# Patient Record
Sex: FEMALE | Race: WHITE | NOT HISPANIC OR LATINO | Employment: FULL TIME | URBAN - METROPOLITAN AREA
[De-identification: names, ages, dates, MRNs, and addresses within clinical notes are randomized per-mention and may not be internally consistent; named-entity substitution may affect disease eponyms.]

---

## 2021-05-27 ENCOUNTER — OCCUPATIONAL HEALTH (OUTPATIENT)
Dept: URGENT CARE | Facility: CLINIC | Age: 31
End: 2021-05-27

## 2021-05-27 DIAGNOSIS — Z02.83 ENCOUNTER FOR DRUG SCREENING: ICD-10-CM

## 2021-05-27 DIAGNOSIS — Z02.1 PRE-EMPLOYMENT EXAMINATION: Primary | ICD-10-CM

## 2021-05-27 PROCEDURE — 97750 LIFT TEST: ICD-10-PCS | Mod: S$GLB,,, | Performed by: FAMILY MEDICINE

## 2021-05-27 PROCEDURE — 80306 DRUG TEST PRSMV INSTRMNT: CPT | Mod: S$GLB,,, | Performed by: FAMILY MEDICINE

## 2021-05-27 PROCEDURE — 99499 UNLISTED E&M SERVICE: CPT | Mod: S$GLB,,, | Performed by: FAMILY MEDICINE

## 2021-05-27 PROCEDURE — 99499 PHYSICAL, BASIC COMPLEXITY: ICD-10-PCS | Mod: S$GLB,,, | Performed by: FAMILY MEDICINE

## 2021-05-27 PROCEDURE — 97750 PHYSICAL PERFORMANCE TEST: CPT | Mod: S$GLB,,, | Performed by: FAMILY MEDICINE

## 2021-05-27 PROCEDURE — 80306 OOH DOT DRUG SCREEN: ICD-10-PCS | Mod: S$GLB,,, | Performed by: FAMILY MEDICINE

## 2021-07-30 ENCOUNTER — INFUSION (OUTPATIENT)
Dept: INFECTIOUS DISEASES | Facility: HOSPITAL | Age: 31
End: 2021-07-30
Attending: EMERGENCY MEDICINE
Payer: OTHER GOVERNMENT

## 2021-07-30 VITALS
SYSTOLIC BLOOD PRESSURE: 96 MMHG | HEART RATE: 79 BPM | OXYGEN SATURATION: 97 % | TEMPERATURE: 98 F | DIASTOLIC BLOOD PRESSURE: 54 MMHG | RESPIRATION RATE: 18 BRPM

## 2021-07-30 DIAGNOSIS — U07.1 COVID-19: Primary | ICD-10-CM

## 2021-07-30 DIAGNOSIS — U07.1 COVID-19: ICD-10-CM

## 2021-07-30 PROCEDURE — 25000003 PHARM REV CODE 250: Performed by: INTERNAL MEDICINE

## 2021-07-30 PROCEDURE — 63600175 PHARM REV CODE 636 W HCPCS: Performed by: INTERNAL MEDICINE

## 2021-07-30 PROCEDURE — M0243 CASIRIVI AND IMDEVI INFUSION: HCPCS | Performed by: INTERNAL MEDICINE

## 2021-07-30 RX ORDER — EPINEPHRINE 0.3 MG/.3ML
0.3 INJECTION SUBCUTANEOUS
Status: DISCONTINUED | OUTPATIENT
Start: 2021-07-30 | End: 2022-10-26

## 2021-07-30 RX ORDER — ALBUTEROL SULFATE 90 UG/1
2 AEROSOL, METERED RESPIRATORY (INHALATION)
Status: ACTIVE | OUTPATIENT
Start: 2021-07-30

## 2021-07-30 RX ORDER — ONDANSETRON 4 MG/1
4 TABLET, ORALLY DISINTEGRATING ORAL ONCE AS NEEDED
Status: DISCONTINUED | OUTPATIENT
Start: 2021-07-30 | End: 2022-04-25

## 2021-07-30 RX ORDER — ACETAMINOPHEN 325 MG/1
650 TABLET ORAL ONCE AS NEEDED
Status: DISCONTINUED | OUTPATIENT
Start: 2021-07-30 | End: 2022-04-25

## 2021-07-30 RX ORDER — SODIUM CHLORIDE 0.9 % (FLUSH) 0.9 %
10 SYRINGE (ML) INJECTION
Status: DISCONTINUED | OUTPATIENT
Start: 2021-07-30 | End: 2022-04-25

## 2021-07-30 RX ORDER — DIPHENHYDRAMINE HYDROCHLORIDE 50 MG/ML
25 INJECTION INTRAMUSCULAR; INTRAVENOUS ONCE AS NEEDED
Status: DISCONTINUED | OUTPATIENT
Start: 2021-07-30 | End: 2022-04-25

## 2021-07-30 RX ADMIN — CASIRIVIMAB AND IMDEVIMAB 600 MG: 600; 600 INJECTION, SOLUTION, CONCENTRATE INTRAVENOUS at 02:07

## 2021-09-09 ENCOUNTER — OFFICE VISIT (OUTPATIENT)
Dept: URGENT CARE | Facility: CLINIC | Age: 31
End: 2021-09-09
Payer: COMMERCIAL

## 2021-09-09 VITALS
TEMPERATURE: 98 F | SYSTOLIC BLOOD PRESSURE: 116 MMHG | HEART RATE: 73 BPM | HEIGHT: 64 IN | OXYGEN SATURATION: 96 % | WEIGHT: 199 LBS | BODY MASS INDEX: 33.97 KG/M2 | RESPIRATION RATE: 16 BRPM | DIASTOLIC BLOOD PRESSURE: 63 MMHG

## 2021-09-09 DIAGNOSIS — Z02.83 ENCOUNTER FOR DRUG SCREENING: ICD-10-CM

## 2021-09-09 DIAGNOSIS — M54.9 DORSALGIA, UNSPECIFIED: Primary | ICD-10-CM

## 2021-09-09 LAB
CTP QC/QA: YES
POC 10 PANEL DRUG SCREEN: NEGATIVE

## 2021-09-09 PROCEDURE — 96372 THER/PROPH/DIAG INJ SC/IM: CPT | Mod: S$GLB,,, | Performed by: FAMILY MEDICINE

## 2021-09-09 PROCEDURE — 80305 POCT RAPID DRUG SCREEN 10 PANEL: ICD-10-PCS | Mod: S$GLB,,, | Performed by: FAMILY MEDICINE

## 2021-09-09 PROCEDURE — 72100 X-RAY EXAM L-S SPINE 2/3 VWS: CPT | Mod: S$GLB,,, | Performed by: RADIOLOGY

## 2021-09-09 PROCEDURE — 99213 OFFICE O/P EST LOW 20 MIN: CPT | Mod: 25,S$GLB,, | Performed by: FAMILY MEDICINE

## 2021-09-09 PROCEDURE — 96372 PR INJECTION,THERAP/PROPH/DIAG2ST, IM OR SUBCUT: ICD-10-PCS | Mod: S$GLB,,, | Performed by: FAMILY MEDICINE

## 2021-09-09 PROCEDURE — 99213 PR OFFICE/OUTPT VISIT, EST, LEVL III, 20-29 MIN: ICD-10-PCS | Mod: 25,S$GLB,, | Performed by: FAMILY MEDICINE

## 2021-09-09 PROCEDURE — 72100 XR LUMBAR SPINE 2 OR 3 VIEWS: ICD-10-PCS | Mod: S$GLB,,, | Performed by: RADIOLOGY

## 2021-09-09 PROCEDURE — 80305 DRUG TEST PRSMV DIR OPT OBS: CPT | Mod: S$GLB,,, | Performed by: FAMILY MEDICINE

## 2021-09-09 RX ORDER — KETOROLAC TROMETHAMINE 30 MG/ML
30 INJECTION, SOLUTION INTRAMUSCULAR; INTRAVENOUS
Status: COMPLETED | OUTPATIENT
Start: 2021-09-09 | End: 2021-09-09

## 2021-09-09 RX ORDER — METHOCARBAMOL 500 MG/1
500 TABLET, FILM COATED ORAL 2 TIMES DAILY PRN
Qty: 30 TABLET | Refills: 0 | Status: SHIPPED | OUTPATIENT
Start: 2021-09-09 | End: 2021-09-19

## 2021-09-09 RX ORDER — METHYLPREDNISOLONE 4 MG/1
4 TABLET ORAL DAILY
Qty: 1 PACKAGE | Refills: 0 | Status: SHIPPED | OUTPATIENT
Start: 2021-09-09 | End: 2022-04-25

## 2021-09-09 RX ADMIN — KETOROLAC TROMETHAMINE 30 MG: 30 INJECTION, SOLUTION INTRAMUSCULAR; INTRAVENOUS at 11:09

## 2021-09-13 ENCOUNTER — OFFICE VISIT (OUTPATIENT)
Dept: URGENT CARE | Facility: CLINIC | Age: 31
End: 2021-09-13
Payer: COMMERCIAL

## 2021-09-13 VITALS
DIASTOLIC BLOOD PRESSURE: 78 MMHG | RESPIRATION RATE: 16 BRPM | TEMPERATURE: 99 F | HEART RATE: 71 BPM | OXYGEN SATURATION: 100 % | WEIGHT: 199 LBS | BODY MASS INDEX: 33.97 KG/M2 | SYSTOLIC BLOOD PRESSURE: 115 MMHG | HEIGHT: 64 IN

## 2021-09-13 DIAGNOSIS — M54.9 DORSALGIA, UNSPECIFIED: Primary | ICD-10-CM

## 2021-09-13 PROCEDURE — 99214 PR OFFICE/OUTPT VISIT, EST, LEVL IV, 30-39 MIN: ICD-10-PCS | Mod: 25,S$GLB,, | Performed by: FAMILY MEDICINE

## 2021-09-13 PROCEDURE — 99214 OFFICE O/P EST MOD 30 MIN: CPT | Mod: 25,S$GLB,, | Performed by: FAMILY MEDICINE

## 2021-09-13 PROCEDURE — 96372 PR INJECTION,THERAP/PROPH/DIAG2ST, IM OR SUBCUT: ICD-10-PCS | Mod: S$GLB,,, | Performed by: FAMILY MEDICINE

## 2021-09-13 PROCEDURE — 96372 THER/PROPH/DIAG INJ SC/IM: CPT | Mod: S$GLB,,, | Performed by: FAMILY MEDICINE

## 2021-09-13 RX ORDER — NAPROXEN 500 MG/1
500 TABLET ORAL 2 TIMES DAILY WITH MEALS
Qty: 30 TABLET | Refills: 0 | Status: SHIPPED | OUTPATIENT
Start: 2021-09-13 | End: 2022-04-25

## 2021-09-13 RX ORDER — KETOROLAC TROMETHAMINE 30 MG/ML
30 INJECTION, SOLUTION INTRAMUSCULAR; INTRAVENOUS
Status: COMPLETED | OUTPATIENT
Start: 2021-09-13 | End: 2021-09-13

## 2021-09-13 RX ADMIN — KETOROLAC TROMETHAMINE 30 MG: 30 INJECTION, SOLUTION INTRAMUSCULAR; INTRAVENOUS at 11:09

## 2021-09-23 ENCOUNTER — CLINICAL SUPPORT (OUTPATIENT)
Dept: REHABILITATION | Facility: HOSPITAL | Age: 31
End: 2021-09-23
Payer: COMMERCIAL

## 2021-09-23 DIAGNOSIS — M54.50 ACUTE LEFT-SIDED LOW BACK PAIN WITHOUT SCIATICA: ICD-10-CM

## 2021-09-23 PROCEDURE — 97110 THERAPEUTIC EXERCISES: CPT

## 2021-09-23 PROCEDURE — 97014 ELECTRIC STIMULATION THERAPY: CPT

## 2021-09-23 PROCEDURE — 97161 PT EVAL LOW COMPLEX 20 MIN: CPT

## 2021-09-23 PROCEDURE — 97140 MANUAL THERAPY 1/> REGIONS: CPT

## 2021-09-24 ENCOUNTER — CLINICAL SUPPORT (OUTPATIENT)
Dept: REHABILITATION | Facility: HOSPITAL | Age: 31
End: 2021-09-24
Payer: COMMERCIAL

## 2021-09-24 DIAGNOSIS — M54.50 ACUTE LEFT-SIDED LOW BACK PAIN WITHOUT SCIATICA: Primary | ICD-10-CM

## 2021-09-24 PROCEDURE — 97014 ELECTRIC STIMULATION THERAPY: CPT

## 2021-09-24 PROCEDURE — 97140 MANUAL THERAPY 1/> REGIONS: CPT

## 2021-09-24 PROCEDURE — 97110 THERAPEUTIC EXERCISES: CPT

## 2021-09-27 PROBLEM — M54.50 ACUTE LEFT-SIDED LOW BACK PAIN WITHOUT SCIATICA: Status: ACTIVE | Noted: 2021-09-27

## 2021-10-04 ENCOUNTER — OFFICE VISIT (OUTPATIENT)
Dept: URGENT CARE | Facility: CLINIC | Age: 31
End: 2021-10-04
Payer: COMMERCIAL

## 2021-10-04 ENCOUNTER — CLINICAL SUPPORT (OUTPATIENT)
Dept: REHABILITATION | Facility: HOSPITAL | Age: 31
End: 2021-10-04
Payer: COMMERCIAL

## 2021-10-04 VITALS
DIASTOLIC BLOOD PRESSURE: 85 MMHG | SYSTOLIC BLOOD PRESSURE: 110 MMHG | OXYGEN SATURATION: 97 % | HEIGHT: 64 IN | BODY MASS INDEX: 33.97 KG/M2 | HEART RATE: 82 BPM | WEIGHT: 199 LBS | TEMPERATURE: 98 F | RESPIRATION RATE: 16 BRPM

## 2021-10-04 DIAGNOSIS — M54.50 ACUTE LEFT-SIDED LOW BACK PAIN WITHOUT SCIATICA: Primary | ICD-10-CM

## 2021-10-04 DIAGNOSIS — M54.9 DORSALGIA, UNSPECIFIED: Primary | ICD-10-CM

## 2021-10-04 PROCEDURE — 99214 OFFICE O/P EST MOD 30 MIN: CPT | Mod: S$GLB,,, | Performed by: FAMILY MEDICINE

## 2021-10-04 PROCEDURE — 97140 MANUAL THERAPY 1/> REGIONS: CPT

## 2021-10-04 PROCEDURE — 97014 ELECTRIC STIMULATION THERAPY: CPT

## 2021-10-04 PROCEDURE — 99214 PR OFFICE/OUTPT VISIT, EST, LEVL IV, 30-39 MIN: ICD-10-PCS | Mod: S$GLB,,, | Performed by: FAMILY MEDICINE

## 2021-10-04 PROCEDURE — 97110 THERAPEUTIC EXERCISES: CPT

## 2021-10-07 ENCOUNTER — HOSPITAL ENCOUNTER (EMERGENCY)
Facility: HOSPITAL | Age: 31
Discharge: HOME | End: 2021-10-07
Attending: EMERGENCY MEDICINE
Payer: OTHER GOVERNMENT

## 2021-10-07 VITALS
WEIGHT: 198.44 LBS | TEMPERATURE: 99 F | SYSTOLIC BLOOD PRESSURE: 132 MMHG | RESPIRATION RATE: 18 BRPM | HEART RATE: 99 BPM | BODY MASS INDEX: 33.88 KG/M2 | DIASTOLIC BLOOD PRESSURE: 85 MMHG | OXYGEN SATURATION: 98 % | HEIGHT: 64 IN

## 2021-10-07 DIAGNOSIS — J20.9 ACUTE BRONCHITIS, UNSPECIFIED ORGANISM: Primary | ICD-10-CM

## 2021-10-07 DIAGNOSIS — R07.89 ATYPICAL CHEST PAIN: ICD-10-CM

## 2021-10-07 LAB
CTP QC/QA: YES
SARS-COV-2 RDRP RESP QL NAA+PROBE: NEGATIVE

## 2021-10-07 PROCEDURE — U0002 COVID-19 LAB TEST NON-CDC: HCPCS | Performed by: EMERGENCY MEDICINE

## 2021-10-07 PROCEDURE — 93010 ELECTROCARDIOGRAM REPORT: CPT | Mod: ,,, | Performed by: INTERNAL MEDICINE

## 2021-10-07 PROCEDURE — 93005 ELECTROCARDIOGRAM TRACING: CPT

## 2021-10-07 PROCEDURE — 93010 EKG 12-LEAD: ICD-10-PCS | Mod: ,,, | Performed by: INTERNAL MEDICINE

## 2021-10-07 PROCEDURE — 99284 EMERGENCY DEPT VISIT MOD MDM: CPT | Mod: 25

## 2021-10-21 ENCOUNTER — TELEPHONE (OUTPATIENT)
Dept: REHABILITATION | Facility: HOSPITAL | Age: 31
End: 2021-10-21

## 2022-10-26 ENCOUNTER — OFFICE VISIT (OUTPATIENT)
Dept: FAMILY MEDICINE | Facility: CLINIC | Age: 32
End: 2022-10-26
Attending: FAMILY MEDICINE
Payer: COMMERCIAL

## 2022-10-26 VITALS
SYSTOLIC BLOOD PRESSURE: 112 MMHG | WEIGHT: 201.19 LBS | BODY MASS INDEX: 33.52 KG/M2 | HEIGHT: 65 IN | HEART RATE: 84 BPM | OXYGEN SATURATION: 99 % | TEMPERATURE: 97 F | DIASTOLIC BLOOD PRESSURE: 72 MMHG

## 2022-10-26 DIAGNOSIS — K62.89 PROCTALGIA: ICD-10-CM

## 2022-10-26 DIAGNOSIS — K59.00 CONSTIPATION, UNSPECIFIED CONSTIPATION TYPE: Primary | ICD-10-CM

## 2022-10-26 DIAGNOSIS — Z82.49 FAMILY HISTORY OF PREMATURE CAD: ICD-10-CM

## 2022-10-26 DIAGNOSIS — Z72.0 TOBACCO ABUSE: ICD-10-CM

## 2022-10-26 DIAGNOSIS — E66.09 CLASS 1 OBESITY DUE TO EXCESS CALORIES WITHOUT SERIOUS COMORBIDITY WITH BODY MASS INDEX (BMI) OF 33.0 TO 33.9 IN ADULT: ICD-10-CM

## 2022-10-26 PROBLEM — E66.811 CLASS 1 OBESITY DUE TO EXCESS CALORIES WITHOUT SERIOUS COMORBIDITY WITH BODY MASS INDEX (BMI) OF 33.0 TO 33.9 IN ADULT: Status: ACTIVE | Noted: 2022-10-26

## 2022-10-26 PROBLEM — M54.50 ACUTE LEFT-SIDED LOW BACK PAIN WITHOUT SCIATICA: Status: RESOLVED | Noted: 2021-09-27 | Resolved: 2022-10-26

## 2022-10-26 PROCEDURE — 1159F MED LIST DOCD IN RCRD: CPT | Mod: CPTII,S$GLB,, | Performed by: FAMILY MEDICINE

## 2022-10-26 PROCEDURE — 99385 PREV VISIT NEW AGE 18-39: CPT | Mod: S$GLB,,, | Performed by: FAMILY MEDICINE

## 2022-10-26 PROCEDURE — 1160F RVW MEDS BY RX/DR IN RCRD: CPT | Mod: CPTII,S$GLB,, | Performed by: FAMILY MEDICINE

## 2022-10-26 PROCEDURE — 3078F DIAST BP <80 MM HG: CPT | Mod: CPTII,S$GLB,, | Performed by: FAMILY MEDICINE

## 2022-10-26 PROCEDURE — 1159F PR MEDICATION LIST DOCUMENTED IN MEDICAL RECORD: ICD-10-PCS | Mod: CPTII,S$GLB,, | Performed by: FAMILY MEDICINE

## 2022-10-26 PROCEDURE — 99999 PR PBB SHADOW E&M-EST. PATIENT-LVL V: CPT | Mod: PBBFAC,,, | Performed by: FAMILY MEDICINE

## 2022-10-26 PROCEDURE — 3078F PR MOST RECENT DIASTOLIC BLOOD PRESSURE < 80 MM HG: ICD-10-PCS | Mod: CPTII,S$GLB,, | Performed by: FAMILY MEDICINE

## 2022-10-26 PROCEDURE — 3074F PR MOST RECENT SYSTOLIC BLOOD PRESSURE < 130 MM HG: ICD-10-PCS | Mod: CPTII,S$GLB,, | Performed by: FAMILY MEDICINE

## 2022-10-26 PROCEDURE — 99385 PR PREVENTIVE VISIT,NEW,18-39: ICD-10-PCS | Mod: S$GLB,,, | Performed by: FAMILY MEDICINE

## 2022-10-26 PROCEDURE — 99999 PR PBB SHADOW E&M-EST. PATIENT-LVL V: ICD-10-PCS | Mod: PBBFAC,,, | Performed by: FAMILY MEDICINE

## 2022-10-26 PROCEDURE — 3074F SYST BP LT 130 MM HG: CPT | Mod: CPTII,S$GLB,, | Performed by: FAMILY MEDICINE

## 2022-10-26 PROCEDURE — 1160F PR REVIEW ALL MEDS BY PRESCRIBER/CLIN PHARMACIST DOCUMENTED: ICD-10-PCS | Mod: CPTII,S$GLB,, | Performed by: FAMILY MEDICINE

## 2022-10-26 NOTE — PROGRESS NOTES
Subjective:       Patient ID: Rebecca Shetty is a 32 y.o. female.    Chief Complaint: Establish Care    32 y old female with tobacco abuse and family hx of premature CAD  here for a wellness exam . She had BRCAA test and she was told she had a 90% chance of developing colon ca . She has not had  colo . BM alternate from severe constipation to diarrhea. Also gets sudden proctalgia and has felt bulge in anus.  Will like to defer vaccinations at this time. Contemplating smoking cessation . She sees Dr. Khan for Well woman exam . No depression , anxious at times . Past due for opth and dental exam . Plays soccer with her kids. No dietary restrictions      Review of Systems   Constitutional: Negative.    HENT: Negative.     Eyes: Negative.    Respiratory: Negative.     Cardiovascular: Negative.    Gastrointestinal:  Positive for constipation, diarrhea and rectal pain.   Genitourinary: Negative.    Musculoskeletal: Negative.    Skin: Negative.    Hematological: Negative.      Objective:      Physical Exam  Constitutional:       General: She is not in acute distress.     Appearance: She is well-developed. She is not diaphoretic.   HENT:      Head: Normocephalic and atraumatic.      Right Ear: External ear normal.      Left Ear: External ear normal.      Mouth/Throat:      Pharynx: No oropharyngeal exudate.   Eyes:      General: No scleral icterus.        Right eye: No discharge.         Left eye: No discharge.      Conjunctiva/sclera: Conjunctivae normal.      Pupils: Pupils are equal, round, and reactive to light.   Neck:      Thyroid: No thyromegaly.      Vascular: No JVD.      Trachea: No tracheal deviation.   Cardiovascular:      Rate and Rhythm: Normal rate and regular rhythm.      Heart sounds: Normal heart sounds. No murmur heard.    No friction rub. No gallop.   Pulmonary:      Effort: Pulmonary effort is normal. No respiratory distress.      Breath sounds: Normal breath sounds. No stridor. No wheezing  or rales.   Chest:      Chest wall: No tenderness.   Abdominal:      General: Bowel sounds are normal. There is no distension.      Palpations: Abdomen is soft.      Tenderness: There is no abdominal tenderness. There is no guarding or rebound.   Musculoskeletal:         General: Normal range of motion.      Cervical back: Normal range of motion and neck supple.   Lymphadenopathy:      Cervical: No cervical adenopathy.   Skin:     General: Skin is warm and dry.   Neurological:      Mental Status: She is alert and oriented to person, place, and time.   Psychiatric:         Behavior: Behavior normal.         Thought Content: Thought content normal.         Judgment: Judgment normal.       Assessment:           Rebecca was seen today for establish care.    Diagnoses and all orders for this visit:    Constipation, unspecified constipation type  -     Ambulatory referral/consult to Endo Procedure ; Future  -     TSH; Future  -     Ambulatory referral/consult to Colorectal Surgery; Future    Tobacco abuse  -     CBC Auto Differential; Future  -     Comprehensive Metabolic Panel; Future  -     Hemoglobin A1C; Future  -     Lipid Panel; Future    Proctalgia  -     Ambulatory referral/consult to Colorectal Surgery; Future    Family history of premature CAD     Plan:     Rebecca was seen today for establish care.    Diagnoses and all orders for this visit:    Tobacco abuse     CRS, colo   Work on smoking cessation   Statin ?   Discussed and recommended healthy eating habits and lifestyle changes including daily exercise of 30 mins, low salt diet, low fat diet and weight loss/maintenance to a normal body mass index 25 or below. I recommend routine use of seatbelts. Discouraged illicit drug use including tobacco use. I recommend no alcohol use but if you decide to drink, limit to 1 drink per day for females and 2 drinks per day for males. Keep up with your yearly physical visit with age appropriate screenings,  vaccinations, and labs.

## 2022-10-27 ENCOUNTER — LAB VISIT (OUTPATIENT)
Dept: LAB | Facility: HOSPITAL | Age: 32
End: 2022-10-27
Attending: FAMILY MEDICINE
Payer: COMMERCIAL

## 2022-10-27 DIAGNOSIS — Z72.0 TOBACCO ABUSE: ICD-10-CM

## 2022-10-27 DIAGNOSIS — K59.00 CONSTIPATION, UNSPECIFIED CONSTIPATION TYPE: ICD-10-CM

## 2022-10-27 LAB
ALBUMIN SERPL BCP-MCNC: 4.2 G/DL (ref 3.5–5.2)
ALP SERPL-CCNC: 77 U/L (ref 55–135)
ALT SERPL W/O P-5'-P-CCNC: 16 U/L (ref 10–44)
ANION GAP SERPL CALC-SCNC: 9 MMOL/L (ref 8–16)
AST SERPL-CCNC: 16 U/L (ref 10–40)
BASOPHILS # BLD AUTO: 0.06 K/UL (ref 0–0.2)
BASOPHILS NFR BLD: 0.7 % (ref 0–1.9)
BILIRUB SERPL-MCNC: 0.6 MG/DL (ref 0.1–1)
BUN SERPL-MCNC: 10 MG/DL (ref 6–20)
CALCIUM SERPL-MCNC: 9.4 MG/DL (ref 8.7–10.5)
CHLORIDE SERPL-SCNC: 105 MMOL/L (ref 95–110)
CHOLEST SERPL-MCNC: 208 MG/DL (ref 120–199)
CHOLEST/HDLC SERPL: 4.2 {RATIO} (ref 2–5)
CO2 SERPL-SCNC: 22 MMOL/L (ref 23–29)
CREAT SERPL-MCNC: 0.7 MG/DL (ref 0.5–1.4)
DIFFERENTIAL METHOD: NORMAL
EOSINOPHIL # BLD AUTO: 0.4 K/UL (ref 0–0.5)
EOSINOPHIL NFR BLD: 4.6 % (ref 0–8)
ERYTHROCYTE [DISTWIDTH] IN BLOOD BY AUTOMATED COUNT: 12 % (ref 11.5–14.5)
EST. GFR  (NO RACE VARIABLE): >60 ML/MIN/1.73 M^2
ESTIMATED AVG GLUCOSE: 100 MG/DL (ref 68–131)
GLUCOSE SERPL-MCNC: 88 MG/DL (ref 70–110)
HBA1C MFR BLD: 5.1 % (ref 4–5.6)
HCT VFR BLD AUTO: 45.8 % (ref 37–48.5)
HDLC SERPL-MCNC: 49 MG/DL (ref 40–75)
HDLC SERPL: 23.6 % (ref 20–50)
HGB BLD-MCNC: 15.4 G/DL (ref 12–16)
IMM GRANULOCYTES # BLD AUTO: 0.03 K/UL (ref 0–0.04)
IMM GRANULOCYTES NFR BLD AUTO: 0.4 % (ref 0–0.5)
LDLC SERPL CALC-MCNC: 144.4 MG/DL (ref 63–159)
LYMPHOCYTES # BLD AUTO: 1.9 K/UL (ref 1–4.8)
LYMPHOCYTES NFR BLD: 21.8 % (ref 18–48)
MCH RBC QN AUTO: 30.4 PG (ref 27–31)
MCHC RBC AUTO-ENTMCNC: 33.6 G/DL (ref 32–36)
MCV RBC AUTO: 91 FL (ref 82–98)
MONOCYTES # BLD AUTO: 0.5 K/UL (ref 0.3–1)
MONOCYTES NFR BLD: 5.3 % (ref 4–15)
NEUTROPHILS # BLD AUTO: 5.7 K/UL (ref 1.8–7.7)
NEUTROPHILS NFR BLD: 67.2 % (ref 38–73)
NONHDLC SERPL-MCNC: 159 MG/DL
NRBC BLD-RTO: 0 /100 WBC
PLATELET # BLD AUTO: 336 K/UL (ref 150–450)
PMV BLD AUTO: 10.7 FL (ref 9.2–12.9)
POTASSIUM SERPL-SCNC: 3.9 MMOL/L (ref 3.5–5.1)
PROT SERPL-MCNC: 7.2 G/DL (ref 6–8.4)
RBC # BLD AUTO: 5.06 M/UL (ref 4–5.4)
SODIUM SERPL-SCNC: 136 MMOL/L (ref 136–145)
TRIGL SERPL-MCNC: 73 MG/DL (ref 30–150)
TSH SERPL DL<=0.005 MIU/L-ACNC: 1.26 UIU/ML (ref 0.4–4)
WBC # BLD AUTO: 8.5 K/UL (ref 3.9–12.7)

## 2022-10-27 PROCEDURE — 84443 ASSAY THYROID STIM HORMONE: CPT | Performed by: FAMILY MEDICINE

## 2022-10-27 PROCEDURE — 80061 LIPID PANEL: CPT | Performed by: FAMILY MEDICINE

## 2022-10-27 PROCEDURE — 36415 COLL VENOUS BLD VENIPUNCTURE: CPT | Mod: PO | Performed by: FAMILY MEDICINE

## 2022-10-27 PROCEDURE — 83036 HEMOGLOBIN GLYCOSYLATED A1C: CPT | Performed by: FAMILY MEDICINE

## 2022-10-27 PROCEDURE — 85025 COMPLETE CBC W/AUTO DIFF WBC: CPT | Performed by: FAMILY MEDICINE

## 2022-10-27 PROCEDURE — 80053 COMPREHEN METABOLIC PANEL: CPT | Performed by: FAMILY MEDICINE

## 2022-10-28 ENCOUNTER — HOSPITAL ENCOUNTER (OUTPATIENT)
Dept: PREADMISSION TESTING | Facility: HOSPITAL | Age: 32
Discharge: HOME | End: 2022-10-28
Payer: COMMERCIAL

## 2022-10-28 DIAGNOSIS — K59.00 CONSTIPATION, UNSPECIFIED CONSTIPATION TYPE: Primary | ICD-10-CM

## 2022-10-28 RX ORDER — POLYETHYLENE GLYCOL 3350, SODIUM SULFATE ANHYDROUS, SODIUM BICARBONATE, SODIUM CHLORIDE, POTASSIUM CHLORIDE 236; 22.74; 6.74; 5.86; 2.97 G/4L; G/4L; G/4L; G/4L; G/4L
4 POWDER, FOR SOLUTION ORAL ONCE
Qty: 4000 ML | Refills: 0 | Status: SHIPPED | OUTPATIENT
Start: 2022-10-28 | End: 2022-10-28

## 2022-11-10 ENCOUNTER — PATIENT MESSAGE (OUTPATIENT)
Dept: HEPATOLOGY | Facility: CLINIC | Age: 32
End: 2022-11-10
Payer: COMMERCIAL

## 2022-11-18 ENCOUNTER — PATIENT MESSAGE (OUTPATIENT)
Dept: GASTROENTEROLOGY | Facility: CLINIC | Age: 32
End: 2022-11-18
Payer: COMMERCIAL

## 2022-11-21 ENCOUNTER — TELEPHONE (OUTPATIENT)
Dept: FAMILY MEDICINE | Facility: CLINIC | Age: 32
End: 2022-11-21
Payer: COMMERCIAL

## 2022-11-21 DIAGNOSIS — Z12.11 COLON CANCER SCREENING: Primary | ICD-10-CM

## 2022-11-21 NOTE — TELEPHONE ENCOUNTER
----- Message from Joya Sherwood LPN sent at 11/21/2022  1:45 PM CST -----  Please adjust code for endo procedure on 11/30.     ----- Message -----  From: Sadie Cummings  Sent: 11/21/2022   1:44 PM CST  To: Monique Murray Staff    Please call pt @ [phone] regarding procedure romina 11/30, pt states states the price is 1.200, but doctor can code it as preventive care and pt will not have to pay copay will not be so cruz

## 2022-11-30 ENCOUNTER — HOSPITAL ENCOUNTER (OUTPATIENT)
Facility: HOSPITAL | Age: 32
Discharge: HOME | End: 2022-11-30
Attending: COLON & RECTAL SURGERY | Admitting: COLON & RECTAL SURGERY
Payer: COMMERCIAL

## 2022-11-30 ENCOUNTER — ANESTHESIA (OUTPATIENT)
Dept: ENDOSCOPY | Facility: HOSPITAL | Age: 32
End: 2022-11-30
Payer: COMMERCIAL

## 2022-11-30 ENCOUNTER — ANESTHESIA EVENT (OUTPATIENT)
Dept: ENDOSCOPY | Facility: HOSPITAL | Age: 32
End: 2022-11-30
Payer: COMMERCIAL

## 2022-11-30 ENCOUNTER — PATIENT MESSAGE (OUTPATIENT)
Dept: GASTROENTEROLOGY | Facility: CLINIC | Age: 32
End: 2022-11-30
Payer: COMMERCIAL

## 2022-11-30 DIAGNOSIS — Z12.11 SCREENING FOR COLON CANCER: ICD-10-CM

## 2022-11-30 LAB
B-HCG UR QL: NEGATIVE
CTP QC/QA: YES

## 2022-11-30 PROCEDURE — 37000009 HC ANESTHESIA EA ADD 15 MINS: Performed by: COLON & RECTAL SURGERY

## 2022-11-30 PROCEDURE — 81025 URINE PREGNANCY TEST: CPT | Performed by: COLON & RECTAL SURGERY

## 2022-11-30 PROCEDURE — 37000008 HC ANESTHESIA 1ST 15 MINUTES: Performed by: COLON & RECTAL SURGERY

## 2022-11-30 PROCEDURE — G0121 COLON CA SCRN NOT HI RSK IND: ICD-10-PCS | Mod: ,,, | Performed by: COLON & RECTAL SURGERY

## 2022-11-30 PROCEDURE — G0121 COLON CA SCRN NOT HI RSK IND: HCPCS | Performed by: COLON & RECTAL SURGERY

## 2022-11-30 PROCEDURE — 63600175 PHARM REV CODE 636 W HCPCS: Performed by: COLON & RECTAL SURGERY

## 2022-11-30 PROCEDURE — 25000003 PHARM REV CODE 250: Performed by: NURSE ANESTHETIST, CERTIFIED REGISTERED

## 2022-11-30 PROCEDURE — 63600175 PHARM REV CODE 636 W HCPCS: Performed by: NURSE ANESTHETIST, CERTIFIED REGISTERED

## 2022-11-30 PROCEDURE — G0121 COLON CA SCRN NOT HI RSK IND: HCPCS | Mod: ,,, | Performed by: COLON & RECTAL SURGERY

## 2022-11-30 RX ORDER — PROPOFOL 10 MG/ML
VIAL (ML) INTRAVENOUS
Status: DISCONTINUED | OUTPATIENT
Start: 2022-11-30 | End: 2022-11-30

## 2022-11-30 RX ORDER — SODIUM CHLORIDE, SODIUM LACTATE, POTASSIUM CHLORIDE, CALCIUM CHLORIDE 600; 310; 30; 20 MG/100ML; MG/100ML; MG/100ML; MG/100ML
INJECTION, SOLUTION INTRAVENOUS CONTINUOUS
Status: DISCONTINUED | OUTPATIENT
Start: 2022-11-30 | End: 2022-11-30 | Stop reason: HOSPADM

## 2022-11-30 RX ORDER — LIDOCAINE HYDROCHLORIDE 10 MG/ML
INJECTION, SOLUTION EPIDURAL; INFILTRATION; INTRACAUDAL; PERINEURAL
Status: DISCONTINUED | OUTPATIENT
Start: 2022-11-30 | End: 2022-11-30

## 2022-11-30 RX ADMIN — LIDOCAINE HYDROCHLORIDE 30 MG: 10 INJECTION, SOLUTION EPIDURAL; INFILTRATION; INTRACAUDAL; PERINEURAL at 11:11

## 2022-11-30 RX ADMIN — PROPOFOL 30 MG: 10 INJECTION, EMULSION INTRAVENOUS at 11:11

## 2022-11-30 RX ADMIN — PROPOFOL 100 MG: 10 INJECTION, EMULSION INTRAVENOUS at 11:11

## 2022-11-30 RX ADMIN — SODIUM CHLORIDE, SODIUM LACTATE, POTASSIUM CHLORIDE, AND CALCIUM CHLORIDE: 600; 310; 30; 20 INJECTION, SOLUTION INTRAVENOUS at 11:11

## 2022-11-30 NOTE — TRANSFER OF CARE
"Anesthesia Transfer of Care Note    Patient: Rebecca Shetty    Procedure(s) Performed: Procedure(s) (LRB):  COLONOSCOPY (N/A)    Patient location: GI    Anesthesia Type: MAC    Transport from OR: Transported from OR on room air with adequate spontaneous ventilation    Post pain: adequate analgesia    Post assessment: no apparent anesthetic complications    Post vital signs: stable    Level of consciousness: awake    Nausea/Vomiting: no nausea/vomiting    Complications: none    Transfer of care protocol was followed      Last vitals:   Visit Vitals  /72 (BP Location: Left arm, Patient Position: Lying)   Pulse 75   Temp 37.1 °C (98.8 °F) (Temporal)   Resp 16   Ht 5' 5" (1.651 m)   Wt 88.5 kg (195 lb)   SpO2 98%   Breastfeeding No   BMI 32.45 kg/m²     "

## 2022-11-30 NOTE — PLAN OF CARE
Dr. Subramanian  bedside reviewing results with pt and family. Back to baseline. No pain, no n/v. No bleeding. VSS. Discharge folder received. Pt discharged.

## 2022-11-30 NOTE — PROVATION PATIENT INSTRUCTIONS
Discharge Summary/Instructions after an Endoscopic Procedure  Patient Name: Rebecca Shetty  Patient MRN: [id number]  Patient YOB: 1990  Wednesday, November 30, 2022 Dannie Subramanian MD  Dear patient,  As a result of recent federal legislation (The Federal Cures Act), you may   receive lab or pathology results from your procedure in your [facility]   account before your physician is able to contact you. Your physician or   their representative will relay the results to you with their   recommendations at their soonest availability.  Thank you,  RESTRICTIONS:  During your procedure today, you received medications for sedation.  These   medications may affect your judgment, balance and coordination.  Therefore,   for 24 hours, you have the following restrictions:   - DO NOT drive a car, operate machinery, make legal/financial decisions,   sign important papers or drink alcohol.    ACTIVITY:  Today: no heavy lifting, straining or running due to procedural   sedation/anesthesia.  The following day: return to full activity including work.  DIET:  Eat and drink normally unless instructed otherwise.     TREATMENT FOR COMMON SIDE EFFECTS:  - Mild abdominal pain, nausea, belching, bloating or excessive gas:  rest,   eat lightly and use a heating pad.  - Sore Throat: treat with throat lozenges and/or gargle with warm salt   water.  - Because air was used during the procedure, expelling large amounts of air   from your rectum or belching is normal.  - If a bowel prep was taken, you may not have a bowel movement for 1-3 days.    This is normal.  SYMPTOMS TO WATCH FOR AND REPORT TO YOUR PHYSICIAN:  1. Abdominal pain or bloating, other than gas cramps.  2. Chest pain.  3. Back pain.  4. Signs of infection such as: chills or fever occurring within 24 hours   after the procedure.  5. Rectal bleeding, which would show as bright red, maroon, or black stools.   (A tablespoon of blood from the rectum is not serious, especially  if   hemorrhoids are present.)  6. Vomiting.  7. Weakness or dizziness.  GO DIRECTLY TO THE NEAREST EMERGENCY ROOM IF YOU HAVE ANY OF THE FOLLOWING:      Difficulty breathing              Chills and/or fever over 101 F   Persistent vomiting and/or vomiting blood   Severe abdominal pain   Severe chest pain   Black, tarry stools   Bleeding- more than one tablespoon   Any other symptom or condition that you feel may need urgent attention  Your doctor recommends these additional instructions:  If any biopsies were taken, your doctors clinic will contact you in 1 to 2   weeks with any results.  - Discharge patient to home.   - Resume previous diet.   - Continue present medications.   - Repeat colonoscopy in 5 years for screening purposes.   - Return to primary care physician PRN.  For questions, problems or results please call your physician Dannie Subramanian MD at Work:  [phone]  If you have any questions about the above instructions, call the GI   department at [phone] or call the endoscopy unit at [phone]   from 7am until 3 pm.  [facility], EMERGENCY ROOM PHONE NUMBER:   [phone]  IF A COMPLICATION OR EMERGENCY SITUATION ARISES AND YOU ARE UNABLE TO REACH   YOUR PHYSICIAN - GO DIRECTLY TO THE EMERGENCY ROOM.  I have read or have had read to me these discharge instructions for my   procedure and have received a written copy.  I understand these   instructions and will follow-up with my physician if I have any questions.     __________________________________       _____________________________________  Nurse Signature                                          Patient/Designated   Responsible Party Signature  MD Dannie Cross MD  11/30/2022 11:27:20 AM  This report has been verified and signed electronically.  Dear patient,  As a result of recent federal legislation (The Federal Cures Act), you may   receive lab or pathology results from your  procedure in your [facility]   account before your physician is able to contact you. Your physician or   their representative will relay the results to you with their   recommendations at their soonest availability.  Thank you,  PROVATION

## 2022-11-30 NOTE — H&P
"[facility] ([facility])  Colon and Rectal Surgery  History & Physical    Patient Name: Rebecca Shetty  MRN: [id number]  Admission Date: 11/30/2022  Attending Physician: Dannie Subramanian MD  Primary Care Provider: Terri Heaton MD    Patient information was obtained from patient and medical records.    Subjective:     Chief Complaint/Reason for Admission: Here for Colonoscopy    History of Present Illness:  Patient is a 32 y.o. female presents for colonoscopy. Never had colonoscopy. No hematochezia, melena or change in bowel habits. No personal or fam hx of CRC, polyps or IBD. Does report previous BRCA testing and being told she has a high chance of CRC in future (no records available currently).     No current facility-administered medications on file prior to encounter.     No current outpatient medications on file prior to encounter.       Review of patient's allergies indicates:   Allergen Reactions    Ceclor [cefaclor] Other (See Comments)     "Really out of it"    Ciprofloxacin Other (See Comments)     Pt reports that it makes her feel disconnected       History reviewed. No pertinent past medical history.  Past Surgical History:   Procedure Laterality Date    INTRAUTERINE DEVICE INSERTION  05/11/2022    Kyleena    TONSILLECTOMY      TYMPANOSTOMY TUBE PLACEMENT       Family History       Problem Relation (Age of Onset)    Cancer Mother, Maternal Grandfather, Paternal Grandfather    Diabetes Father    Heart disease Father (57)    Hyperlipidemia Father    Hypertension Father    Seizures Father          Tobacco Use    Smoking status: Every Day     Packs/day: 1.00     Types: Cigarettes    Smokeless tobacco: Never   Substance and Sexual Activity    Alcohol use: Yes    Drug use: No    Sexual activity: Not on file     Review of Systems   Constitutional:  Negative for activity change, appetite change, chills, fatigue, fever and unexpected weight change.   HENT:  Negative for congestion, ear pain, " sore throat and trouble swallowing.    Eyes:  Negative for pain, redness and itching.   Respiratory:  Negative for cough, shortness of breath and wheezing.    Cardiovascular:  Negative for chest pain, palpitations and leg swelling.   Gastrointestinal:  Negative for abdominal distention, abdominal pain, anal bleeding, blood in stool, constipation, diarrhea, nausea, rectal pain and vomiting.   Endocrine: Negative for cold intolerance, heat intolerance and polyuria.   Genitourinary:  Negative for dysuria, flank pain, frequency and hematuria.   Musculoskeletal:  Negative for gait problem, joint swelling and neck pain.   Skin:  Negative for color change, rash and wound.   Allergic/Immunologic: Negative for environmental allergies and immunocompromised state.   Neurological:  Negative for dizziness, speech difficulty, weakness and numbness.   Psychiatric/Behavioral:  Negative for agitation, confusion and hallucinations.    Objective:     Vital Signs (Most Recent):  Temp: 98.8 °F (37.1 °C) (11/30/22 1041)  Pulse: 75 (11/30/22 1041)  Resp: 16 (11/30/22 1041)  BP: 107/72 (11/30/22 1041)  SpO2: 98 % (11/30/22 1041) Vital Signs (24h Range):  Temp:  [98.8 °F (37.1 °C)] 98.8 °F (37.1 °C)  Pulse:  [75] 75  Resp:  [16] 16  SpO2:  [98 %] 98 %  BP: (107)/(72) 107/72     Weight: 88.5 kg (195 lb)  Body mass index is 32.45 kg/m².    Physical Exam  Constitutional:       Appearance: She is well-developed.   HENT:      Head: Normocephalic and atraumatic.   Eyes:      Conjunctiva/sclera: Conjunctivae normal.   Neck:      Thyroid: No thyromegaly.   Cardiovascular:      Rate and Rhythm: Normal rate and regular rhythm.   Pulmonary:      Effort: Pulmonary effort is normal. No respiratory distress.   Abdominal:      General: There is no distension.      Palpations: Abdomen is soft. There is no mass.      Tenderness: There is no abdominal tenderness.   Musculoskeletal:         General: No tenderness. Normal range of motion.      Cervical back:  Normal range of motion.   Skin:     General: Skin is warm and dry.      Capillary Refill: Capillary refill takes less than 2 seconds.      Findings: No rash.   Neurological:      Mental Status: She is alert and oriented to person, place, and time.         Assessment/Plan:     Patient is a 32 y.o. female who presents for colonoscopy     - Ok to proceed to endoscopy suite for colonoscopy  - Consent obtained. All risks, benefits and alternatives fully explained to patient, including but not limited to bleeding, infection, perforation, and missed polyps. All questions appropriately answered to patient's satisfaction. Consent signed and placed on chart.    There are no hospital problems to display for this patient.    VTE Risk Mitigation (From admission, onward)      None            Dannie Subramanian MD  Colon and Rectal Surgery  [facility] ([facility])

## 2022-11-30 NOTE — ANESTHESIA PREPROCEDURE EVALUATION
11/30/2022  Rebecca Shetty is a 32 y.o., female.      Pre-op Assessment    I have reviewed the Patient Summary Reports.     I have reviewed the Nursing Notes. I have reviewed the NPO Status.   I have reviewed the Medications.     Review of Systems  Anesthesia Hx:  Denies Family Hx of Anesthesia complications.   Denies Personal Hx of Anesthesia complications.   Social:  Smoker    Cardiovascular:  Cardiovascular Normal     Pulmonary:  Pulmonary Normal    Neurological:  Neurology Normal    Endocrine:  Obesity / BMI > 30      Physical Exam  General: Oriented, Alert, Cooperative and Well nourished    Airway:  Mallampati: II   Mouth Opening: Normal  TM Distance: Normal  Tongue: Normal    Dental:  Intact        Anesthesia Plan  Type of Anesthesia, risks & benefits discussed:    Anesthesia Type: MAC  Intra-op Monitoring Plan: Standard ASA Monitors  Post Op Pain Control Plan: IV/PO Opioids PRN  Induction:  IV  Informed Consent: Informed consent signed with the Patient and all parties understand the risks and agree with anesthesia plan.  All questions answered.   ASA Score: 2  Day of Surgery Review of History & Physical: H&P Update referred to the surgeon/provider.I have interviewed and examined the patient. I have reviewed the patient's H&P dated:     Ready For Surgery From Anesthesia Perspective.     .

## 2022-11-30 NOTE — ANESTHESIA POSTPROCEDURE EVALUATION
Anesthesia Post Evaluation    Patient: Rebecca Shetty    Procedure(s) Performed: Procedure(s) (LRB):  COLONOSCOPY (N/A)    Final Anesthesia Type: MAC      Patient location during evaluation: GI PACU  Patient participation: Yes- Able to Participate  Level of consciousness: awake and alert  Post-procedure vital signs: reviewed and stable  Pain management: adequate  Airway patency: patent    PONV status at discharge: No PONV  Anesthetic complications: no      Cardiovascular status: blood pressure returned to baseline and hemodynamically stable  Respiratory status: unassisted, spontaneous ventilation and room air  Hydration status: euvolemic  Follow-up not needed.          Vitals Value Taken Time   /68 11/30/22 1147   Temp 36.7 °C (98.1 °F) 11/30/22 1147   Pulse 72 11/30/22 1147   Resp 16 11/30/22 1147   SpO2 100 % 11/30/22 1147         Event Time   Out of Recovery 11:50:32         Pain/Mohamud Score: Mohamud Score: 10 (11/30/2022 11:42 AM)

## 2022-12-01 VITALS
HEIGHT: 65 IN | HEART RATE: 72 BPM | BODY MASS INDEX: 32.49 KG/M2 | DIASTOLIC BLOOD PRESSURE: 68 MMHG | SYSTOLIC BLOOD PRESSURE: 106 MMHG | WEIGHT: 195 LBS | RESPIRATION RATE: 16 BRPM | TEMPERATURE: 98 F | OXYGEN SATURATION: 100 %

## 2023-02-22 ENCOUNTER — DOCUMENTATION ONLY (OUTPATIENT)
Dept: GASTROENTEROLOGY | Facility: CLINIC | Age: 33
End: 2023-02-22

## 2023-02-22 ENCOUNTER — OFFICE VISIT (OUTPATIENT)
Dept: GASTROENTEROLOGY | Facility: CLINIC | Age: 33
End: 2023-02-22
Attending: FAMILY MEDICINE
Payer: COMMERCIAL

## 2023-02-22 ENCOUNTER — PATIENT MESSAGE (OUTPATIENT)
Dept: GASTROENTEROLOGY | Facility: CLINIC | Age: 33
End: 2023-02-22

## 2023-02-22 VITALS
DIASTOLIC BLOOD PRESSURE: 78 MMHG | HEIGHT: 65 IN | BODY MASS INDEX: 34.54 KG/M2 | SYSTOLIC BLOOD PRESSURE: 104 MMHG | OXYGEN SATURATION: 99 % | HEART RATE: 80 BPM | WEIGHT: 207.31 LBS

## 2023-02-22 DIAGNOSIS — K62.89 PROCTALGIA: ICD-10-CM

## 2023-02-22 DIAGNOSIS — R11.0 NAUSEA: ICD-10-CM

## 2023-02-22 DIAGNOSIS — K21.9 GASTROESOPHAGEAL REFLUX DISEASE, UNSPECIFIED WHETHER ESOPHAGITIS PRESENT: Primary | ICD-10-CM

## 2023-02-22 DIAGNOSIS — K59.00 CONSTIPATION, UNSPECIFIED CONSTIPATION TYPE: ICD-10-CM

## 2023-02-22 PROCEDURE — 99203 PR OFFICE/OUTPT VISIT, NEW, LEVL III, 30-44 MIN: ICD-10-PCS | Mod: S$GLB,,, | Performed by: PHYSICIAN ASSISTANT

## 2023-02-22 PROCEDURE — 3008F BODY MASS INDEX DOCD: CPT | Mod: CPTII,S$GLB,, | Performed by: PHYSICIAN ASSISTANT

## 2023-02-22 PROCEDURE — 1159F PR MEDICATION LIST DOCUMENTED IN MEDICAL RECORD: ICD-10-PCS | Mod: CPTII,S$GLB,, | Performed by: PHYSICIAN ASSISTANT

## 2023-02-22 PROCEDURE — 3078F DIAST BP <80 MM HG: CPT | Mod: CPTII,S$GLB,, | Performed by: PHYSICIAN ASSISTANT

## 2023-02-22 PROCEDURE — 3078F PR MOST RECENT DIASTOLIC BLOOD PRESSURE < 80 MM HG: ICD-10-PCS | Mod: CPTII,S$GLB,, | Performed by: PHYSICIAN ASSISTANT

## 2023-02-22 PROCEDURE — 3074F SYST BP LT 130 MM HG: CPT | Mod: CPTII,S$GLB,, | Performed by: PHYSICIAN ASSISTANT

## 2023-02-22 PROCEDURE — 99203 OFFICE O/P NEW LOW 30 MIN: CPT | Mod: S$GLB,,, | Performed by: PHYSICIAN ASSISTANT

## 2023-02-22 PROCEDURE — 3008F PR BODY MASS INDEX (BMI) DOCUMENTED: ICD-10-PCS | Mod: CPTII,S$GLB,, | Performed by: PHYSICIAN ASSISTANT

## 2023-02-22 PROCEDURE — 99999 PR PBB SHADOW E&M-EST. PATIENT-LVL V: CPT | Mod: PBBFAC,,, | Performed by: PHYSICIAN ASSISTANT

## 2023-02-22 PROCEDURE — 3074F PR MOST RECENT SYSTOLIC BLOOD PRESSURE < 130 MM HG: ICD-10-PCS | Mod: CPTII,S$GLB,, | Performed by: PHYSICIAN ASSISTANT

## 2023-02-22 PROCEDURE — 1159F MED LIST DOCD IN RCRD: CPT | Mod: CPTII,S$GLB,, | Performed by: PHYSICIAN ASSISTANT

## 2023-02-22 PROCEDURE — 99999 PR PBB SHADOW E&M-EST. PATIENT-LVL V: ICD-10-PCS | Mod: PBBFAC,,, | Performed by: PHYSICIAN ASSISTANT

## 2023-02-22 RX ORDER — PANTOPRAZOLE SODIUM 40 MG/1
40 TABLET, DELAYED RELEASE ORAL DAILY
Qty: 30 TABLET | Refills: 11 | Status: SHIPPED | OUTPATIENT
Start: 2023-02-22 | End: 2024-03-04 | Stop reason: SDUPTHER

## 2023-02-22 NOTE — PROGRESS NOTES
Clinic Consult:  [facility] Consultation Note    Reason for Consult:  The primary encounter diagnosis was Gastroesophageal reflux disease, unspecified whether esophagitis present. Diagnoses of Constipation, unspecified constipation type, Proctalgia, and Nausea were also pertinent to this visit.    PCP: Terri Heaton   [address] [facility] / [facility] [id number]    CC: Constipation      HPI:  This is a 32 y.o. female here for evaluation of the above.   Alternates constipation and diarrhea for years. Tends more toward constipation. Tried OTC medication. Laxatives but has to take multiple in order for them to work. Constipation for a week or 2 then follow with diarrhea. Urgent. No blood. Just had a colonoscopy a couple months ago with colorectal.  Nausea and abdominal soreness often. Deep. Last BM was yesterday but did not empty well. No vomiting.   Was told she needed an upper scope every year, but has not had one in 7-8 years. EGD completed in [place] but she is unsure of the place.   Deals with reflux daily. Used to take Protonix but this was not helpful.   Mother  from pancreatic cancer. No additional family members with pancreatic cancer.  Multiple family members with different cancers.   4-5 years ago had some genetic testing but unsure of where this was done. Unsure if BRCA gene +. [facility] [place] possibly?        Review of Systems   Constitutional:  Negative for activity change, appetite change, chills, diaphoresis, fatigue, fever and unexpected weight change.   HENT:  Negative for trouble swallowing.    Respiratory:  Negative for cough and shortness of breath.    Cardiovascular:  Negative for chest pain.   Gastrointestinal:  Positive for abdominal pain, constipation, diarrhea and nausea. Negative for abdominal distention, anal bleeding, blood in stool and vomiting.   Musculoskeletal:  Negative for back pain.   Skin:  Negative for color change and pallor.  "  Neurological:  Negative for dizziness, weakness and light-headedness.   Psychiatric/Behavioral:  Negative for dysphoric mood. The patient is not nervous/anxious.       Medical History:   No past medical history on file.    Surgical History:   Past Surgical History:   Procedure Laterality Date    COLONOSCOPY N/A 11/30/2022    Procedure: COLONOSCOPY;  Surgeon: Dannie Subramanian MD;  Location: [facility];  Service: General;  Laterality: N/A;    INTRAUTERINE DEVICE INSERTION  05/11/2022    Kyleena    TONSILLECTOMY      TYMPANOSTOMY TUBE PLACEMENT         Family History:    Family History   Problem Relation Age of Onset    Cancer Mother         Pancreas    Heart disease Father 57        CAD    Seizures Father     Diabetes Father     Hypertension Father     Hyperlipidemia Father     Cancer Maternal Grandfather         Stomach    Cancer Paternal Grandfather         Unknown    COPD Neg Hx        Social History:   Social History     Socioeconomic History    Marital status:    Occupational History    Occupation: [employer]    Tobacco Use    Smoking status: Every Day     Packs/day: 1.00     Types: Cigarettes    Smokeless tobacco: Never   Substance and Sexual Activity    Alcohol use: Yes    Drug use: No       Allergies:   Review of patient's allergies indicates:   Allergen Reactions    Ceclor [cefaclor] Other (See Comments)     "Really out of it"    Ciprofloxacin Other (See Comments)     Pt reports that it makes her feel disconnected       Home Medications:   No current outpatient medications on file prior to visit.     Current Facility-Administered Medications on File Prior to Visit   Medication Dose Route Frequency Provider Last Rate Last Admin    albuterol inhaler 2 puff  2 puff Inhalation Q20 Min PRN Fabiano Lopez MD        levonorgestreL (KYLEENA) 17.5 mcg/24 hrs (5 yrs) 19.5 mg IUD 17.5 mcg  17.5 mcg Intrauterine  Lucy House MD   17.5 mcg at 05/11/22 1030       Physical Exam:  BP " "104/78   Pulse 80   Ht 5' 5" (1.651 m)   Wt 94 kg (207 lb 5.5 oz)   SpO2 99%   BMI 34.50 kg/m²   Body mass index is 34.5 kg/m².  Physical Exam  Constitutional:       General: She is not in acute distress.     Appearance: Normal appearance. She is not ill-appearing, toxic-appearing or diaphoretic.   HENT:      Head: Normocephalic and atraumatic.   Eyes:      General: No scleral icterus.     Extraocular Movements: Extraocular movements intact.   Cardiovascular:      Rate and Rhythm: Normal rate and regular rhythm.   Pulmonary:      Effort: Pulmonary effort is normal. No respiratory distress.      Breath sounds: Normal breath sounds.   Abdominal:      General: Bowel sounds are normal. There is no distension.      Palpations: Abdomen is soft. There is no mass.      Tenderness: There is no abdominal tenderness. There is no guarding.   Musculoskeletal:         General: Normal range of motion.      Cervical back: Normal range of motion.   Skin:     General: Skin is warm and dry.      Coloration: Skin is not jaundiced or pale.   Neurological:      Mental Status: She is alert and oriented to person, place, and time.         Labs: Pertinent labs reviewed.  Endoscopy: 7-8 yrs ago  CRC Screenin2022  Anticoagulation: none    Assessment:  1. Gastroesophageal reflux disease, unspecified whether esophagitis present    2. Constipation, unspecified constipation type    3. Proctalgia    4. Nausea         Recommendations:  -Recommend trial of Linzess for chronic constipation. Clean out bowels with Miralax prep followed by daily Linzess. Begin at 145mcg daily.   -Schedule for EGD given uncontrolled reflux, history of multiple family members with cancers (including gastric cancer). Reports she was told she needed this yearly in the past. Unsure of why.  -Begin back on Protonix daily in the interim.   -GERD diet  -Will reach out to [facility] to see if they have records of previous genetic testing. If not, will refer to genetics. " She is unaware if she has BRCA gene. If she does, and has history of FDR with pancreatic cancer, she will need pancreatic cancer screening with CT vs. EUS.    Gastroesophageal reflux disease, unspecified whether esophagitis present  -     pantoprazole (PROTONIX) 40 MG tablet; Take 1 tablet (40 mg total) by mouth once daily.  Dispense: 30 tablet; Refill: 11  -     Case Request Endoscopy: EGD (ESOPHAGOGASTRODUODENOSCOPY)  -     Ambulatory referral/consult to Endo Procedure ; Future; Expected date: 02/23/2023    Constipation, unspecified constipation type  -     Ambulatory referral/consult to Colorectal Surgery  -     linaCLOtide (LINZESS) 145 mcg Cap capsule; Take 1 capsule (145 mcg total) by mouth before breakfast.  Dispense: 30 capsule; Refill: 2    Proctalgia  -     Ambulatory referral/consult to Colorectal Surgery    Nausea  -     Case Request Endoscopy: EGD (ESOPHAGOGASTRODUODENOSCOPY)  -     Ambulatory referral/consult to Endo Procedure ; Future; Expected date: 02/23/2023        No follow-ups on file.    Thank you so much for allowing me to participate in the care of Rebecca Bates PA-C

## 2023-02-22 NOTE — PROGRESS NOTES
2/22/23-Genetic testing requested form [facility] [place]    Fax: [phone]   Phone: [phone]

## 2023-02-22 NOTE — PATIENT INSTRUCTIONS
-Begin with a Miralax prep followed by daily Linzess for constipation.  -Schedule for upper scope for chronic uncontrolled reflux. Begin on daily Protonix.    MIRALAX SPLIT PREP INSTRUCTIONS  Please use this page as a checklist for your preparation.  ITEMS TO PURCHASE BEFORE YOUR PROCEDURE:  Please purchase the following items from your local pharmacy prior to your appointment.   2 Dulcolax (bisacodyl) tablets- no prescription needed, over the counter.   Gas X (simethicone) 125 mg capsules - no prescription needed, over the counter.   Miralax (polyethylene glycol) 238 gram bottle- no prescription needed, over the counter.    DAY 1 :(WHEN YOU WAKE)  begin soft diet only -  Porridge-type hot cereals like oatmeal, grits and Cream-of-Wheat.  Cereals that soften easily in milk like Rice Krispies and Corn Flakes.  Soft breads and muffins.  Pasta cooked to a soft consistency.  Potatoes and sweet potatoes without skin.  Soft fruits like ripe bananas and melon.    DAY 1: (WHEN YOU WAKE)   Mix all the Miralax (238 grams) with 2 liters (64oz.) of any clear, non-carbonated liquid such as Gatorade, PowerAde, or Crystal Lite.    10:00AM or (WHEN YOU WAKE)   Take 20 mg of the Dulcolax (bisacodyl).   Take 1 Gas X (simethicone) 125 mg capsule with a glass of clear liquid    12:00 PM   Drink 1-liter (32 oz) Miralax solution (one 8 oz. glass every 15 minutes until consumed).    DAY 2: (WHEN YOU WAKE)   Drink 1-liter (32 oz) Miralax solution (one 8 oz. glass every 15 minutes until consumed).

## 2023-02-23 ENCOUNTER — PATIENT MESSAGE (OUTPATIENT)
Dept: GASTROENTEROLOGY | Facility: CLINIC | Age: 33
End: 2023-02-23
Payer: COMMERCIAL

## 2023-02-23 ENCOUNTER — TELEPHONE (OUTPATIENT)
Dept: GASTROENTEROLOGY | Facility: CLINIC | Age: 33
End: 2023-02-23
Payer: COMMERCIAL

## 2023-02-23 NOTE — TELEPHONE ENCOUNTER
----- Message from Rosa Jensen sent at 2/23/2023 12:12 PM CST -----  Contact: Rebecca Fair called in regarding the medication linaCLOtide (LINZESS) 145 mcg Cap capsule. She states it was denied by the insurance company. Please call her back at [phone] or respond via [facility].     Thanks  TS

## 2023-02-24 DIAGNOSIS — Z80.0 FAMILY HISTORY OF PANCREATIC CANCER: Primary | ICD-10-CM

## 2023-03-02 ENCOUNTER — HOSPITAL ENCOUNTER (OUTPATIENT)
Dept: PREADMISSION TESTING | Facility: HOSPITAL | Age: 33
Discharge: HOME | End: 2023-03-02
Attending: INTERNAL MEDICINE
Payer: COMMERCIAL

## 2023-03-02 DIAGNOSIS — K21.9 GASTROESOPHAGEAL REFLUX DISEASE, UNSPECIFIED WHETHER ESOPHAGITIS PRESENT: ICD-10-CM

## 2023-03-02 DIAGNOSIS — R11.0 NAUSEA: ICD-10-CM

## 2023-03-07 ENCOUNTER — TELEPHONE (OUTPATIENT)
Dept: GENETICS | Facility: CLINIC | Age: 33
End: 2023-03-07
Payer: COMMERCIAL

## 2023-03-07 ENCOUNTER — OFFICE VISIT (OUTPATIENT)
Dept: GENETICS | Facility: CLINIC | Age: 33
End: 2023-03-07
Payer: COMMERCIAL

## 2023-03-07 ENCOUNTER — PATIENT MESSAGE (OUTPATIENT)
Dept: GENETICS | Facility: CLINIC | Age: 33
End: 2023-03-07

## 2023-03-07 DIAGNOSIS — Z80.0 FAMILY HISTORY OF STOMACH CANCER: Primary | ICD-10-CM

## 2023-03-07 DIAGNOSIS — Z80.0 FAMILY HISTORY OF PANCREATIC CANCER: ICD-10-CM

## 2023-03-07 PROCEDURE — 96040 PR GENETIC COUNSELING, EACH 30 MIN: ICD-10-PCS | Mod: S$GLB,,, | Performed by: INTERNAL MEDICINE

## 2023-03-07 PROCEDURE — 99499 UNLISTED E&M SERVICE: CPT | Mod: S$GLB,,, | Performed by: INTERNAL MEDICINE

## 2023-03-07 PROCEDURE — 96040 PR GENETIC COUNSELING, EACH 30 MIN: CPT | Mod: S$GLB,,, | Performed by: INTERNAL MEDICINE

## 2023-03-07 PROCEDURE — 99999 PR PBB SHADOW E&M-EST. PATIENT-LVL I: ICD-10-PCS | Mod: PBBFAC,,,

## 2023-03-07 PROCEDURE — 99999 PR PBB SHADOW E&M-EST. PATIENT-LVL I: CPT | Mod: PBBFAC,,,

## 2023-03-07 PROCEDURE — 99499 NO LOS: ICD-10-PCS | Mod: S$GLB,,, | Performed by: INTERNAL MEDICINE

## 2023-03-07 NOTE — PROGRESS NOTES
"  Cancer Genetics  Hereditary/High Risk Clinic  Department of Hematology and Oncology  [facility]        Date of Service:  2023  Provider:  Jonny Maldonado MS, [facility]    Patient Information  Name:  Rg Shetty  :  1990  MRN:  [id number]        Referring Provider: Dolores Bates PA-C    The chief complaint leading to consultation is: as below.  Visit type: in-person.  Face-to-face time with patient: approximately 50 minutes.  Approximately 100 minutes in total were spent on this encounter, which includes face-to-face time and non-face-to-face time preparing to see the patient (e.g., review of tests), obtaining and/or reviewing separately obtained history, documenting clinical information in the electronic or other health record, independently interpreting results (not separately reported) and communicating results to the patient/family/caregiver, or care coordination (not separately reported).      SUBJECTIVE:      Chief Complaint: Genetic Counseling    History of Present Illness (HPI):  Rg Shetty ("RG"), 33 y.o., assigned female sex at birth is new to the [facility] of H[facility]ology and Oncology and to me.  She was referred by  Gastroenterology  for genetic cancer risk assessment given her family history of pancreatic cancer. She has no personal history of cancer. Patient recalls having genetic testing ~4-5 years ago. However, she does not recall which provider ordered the testing or which lab completed the testing.    Focused Medical History:   Germline cancer-genetic testing:  Yes - possibly positive related to "increased risk for colorectal cancer", results unknown  Cancer:  No  Colon polyp:  No  Most recent colonoscopy: 2022, told to repeat in 5 years  Most recent mammo: N/a  Other benign tumor/findings:  Yes   Right breast lump (8-10 years ago) - unable to see on breast u/s  Ovarian cysts  Pancreatitis:  No  Pancreatic cyst:  No     Focused Surgical " History  Reproductive organs intact    Ancestry:  Ashkenazi [place] ancestry:  No  Paternal:   Maternal:     Focused Family History:  Consanguinity in ancestors:  No  Germline cancer-genetic testing in blood relatives:  No  Cancer in family:  Yes; there are no known blood relatives affected with cancer other than those mentioned in the pedigree below    Family Cancer Pedigree:           Review of Systems:  See HPI.        SUBJECTIVE:   Records Reviewed  Medical History  Problem List  Any pertinent, available Procedures and Pathology reports in both [facility] and [facility]    COUNSELING   Causes of cancer  Germline cancer genetic testing is the testing of genes associated with cancer, known as cancer susceptibility genes.  Just as these genes are inherited from parents, mutations in these genes can be inherited, as well.  A mutation in a cancer susceptibility gene adversely affects the gene's ability to prevent cancer; therefore, carriers of cancer susceptibility gene mutations may be at increased risk for certain cancers.     Only 5-10% cancers are caused by a cancer susceptibility gene mutation, meaning the cancer is genetic/hereditary; rather, most cancers are sporadic.  Causes of sporadic cancers may include environmental risk factors, lifestyle risk factors, and non-modifiable risk factors.  It is important to note that members of a family often share not only their genetics but also risk factors including environmental and lifestyle risk factors, so cancers can be familial.    Discussed that mutations in MLH1, MSH2, MSH6, PSM2, and EPCAM are associated with Yu syndrome. Typically, individuals with Yu syndrome have increased risks for colorectal and endometrial cancer, as well as, ovarian, gastric, small bowel, ureter/renal pelvis, pancreatic, hepatobilliary tract, sebacous neoplasms, brain, and prostate cancer. Discussed that mutations in other genes may increase the  risks for these cancers.    Also discussed that mutations in BRCA1 and BRCA2 are associated with an increased risk for breast and ovarian cancer, in addition to male breast cancer, pancreatic, melanoma, and prostate cancer. Discussed that mutations in other genes may increase the risk of breast and prostate cancer, in addition to other cancers.     Potential results of genetic testing, and their implications  Potential results of genetic testing include positive, negative, and variant of unknown significance (VUS).    A positive result indicates the presence of at least one clinically significant mutation, and the patient's associated cancer risks vary depending upon the cancer susceptibility gene(s) in which there is/are a mutation(s).  With a positive result, in some cases, depending upon the specific result and the patient's clinical history, modified risk management may be recommended, including measures for risk reduction and/or surveillance; however, modified management is not always an option.    A negative result indicates that no clinically significant mutations were identified in the gene(s) tested.    A VUS indicates that there is not presently enough data for the laboratory to make a determination as to whether the variant is clinically significant.  VUSs are not typically acted upon clinically.       The ability to interpret the meaning of a negative genetic testing result in genes associated with cancer with which the patient has not personally been affected, when done prior to testing the appropriate affected relative(s), is significantly limited.  A negative result in the patient does not indicate that she cannot develop cancer, and, in fact, the patient may even be at increased risk for cancer based on shared risk factors with affected relatives.  The most informative candidates for initial genetic testing in a family are those who have been affected with cancer.     Modified management may also be  recommended, even with a result of no or unknown significance, based upon risk assessment that incorporates the family history.      Recommended her to contact [facility] and [facility] to determine if the genetic testing was performed and to retrieve a copy of her results. Offered to proceed with testing with the possibility that insurance would not cover the testing versus contacting the genetic testing labs first.      Genetic mutation inheritance  If Rebecca tests positive for a mutation, her first-degree relatives would each have a 50% chance of having the same mutation, and other, more distantly related blood-relatives would also be at risk of having the same mutation.       Genetic discrimination  The Genetic Information Nondiscrimination Act (NOREEN) is U.S. federal legislation that provides some protections against use of an individual's genetic information by their health insurer and by their employer.  Title I of NOREEN prohibits most health insurers from utilizing an individual's genetic information to make decisions regarding insurance eligibility or premium charges.  Title II of NOREEN prohibits covered entities, including employers, from requesting the genetic information of employees and applicants.  NOREEN does not protect individuals from genetic discrimination toward health insurance obtained through a job with the  or through the Federal Employees Health Benefits Plan; from genetic discrimination by employers with fewer than 15 employees or if employed by the ; or from genetic discrimination by any other type of policies/entities, including but not limited to life insurance, disability insurance, long-term care insurance,  benefits, and  Health Services benefits.     Genetic testing logistics  An outside laboratory would perform the testing after a blood sample is collected here at [facility] or a saliva sample is collected by the patient at home.  With genetic testing, there is a  potential for the patient to incur out-of-pocket costs.  Results can take 2-3 weeks.  Post-test genetic counseling can be conducted once the genetic testing results are available.     Assessment/Plan  Based on the information provided by Rebecca, she meets current criteria for genetic testing of hereditary pancreatic cancer according to current clinical guidelines. Rebecca's clinical history, including personal history and/or family history, is suggestive of a hereditary cancer syndrome in the family given the family history of pancreatic cancer in her mother.     Offered germline cancer-genetic testing at this time versus deferring testing at this time or declining testing altogether.  Various test panel options were discussed. Rebecca decided to proceed with genetic testing through the 84-gene Multi-Cancer Panel (AIP, ALK, APC, RITA, AXIN2, BAP1, BARD1, BLM, BMPR1A, BRCA1, BRCA2, BRIP1, CASR, CDC73, CDH1, CDK4, CDKN1B, CDKN1C, CDKN2A, CEBPA, CHEK2, CTNNA1, DICER1, DIS3L2, EGFR, EPCAM, FH, FLCN, GATA2, GPC3, GREM1, HOXB13, HRAS, KIT, MAX, MEN1, MET, MITF, MLH1, MSH2, MSH3, MSH6, MUTYH, NBN, NF1, NF2, NTHL1, PALB2, PDGFRA, PHOX2B, PMS2, POLD1, POLE, POT1, MRUZU3X, PTCH1, PTEN, RAD50, RAD51C, RAD51D, RB1, RECQL4, RET, RUNX1, SDHA, SDHAF2, SDHB, SDHC, SDHD, SMAD4, SMARCA4, SMARCB1, SMARCE1, STK11, SUFU, TERC, TERT, GUML830, TP53, TSC1, TSC2, VHL, WRN, WT1).  Rebecca has provided her informed consent to proceed. A blood sample was collected today. Rebecca will reach out if she determines whether genetic testing was previously completed, the results (if applicable) will be discussed at that time.     Questions were encouraged and answered to the patient's satisfaction, and she verbalized understanding of information and agreement with the plan.         Signed,    Jonny Maldonado MS, [facility]  Certified Genetic Counselor, Hereditary and High-Risk Clinic  Hematology/Oncology, [facility]    03/07/2023

## 2023-03-07 NOTE — TELEPHONE ENCOUNTER
Received message to contact patient. Patient stated she was able to contact [facility] and determined she had genetic testing through their lab about 4-5 years ago. Patient reported that she has increased risk for breast, colorectal and other cancers. Requested for patient to send a copy of the results. Will contact the patient again to discuss the results with her.

## 2023-03-08 ENCOUNTER — OFFICE VISIT (OUTPATIENT)
Dept: FAMILY MEDICINE | Facility: CLINIC | Age: 33
End: 2023-03-08
Attending: FAMILY MEDICINE
Payer: COMMERCIAL

## 2023-03-08 VITALS
WEIGHT: 207.25 LBS | HEART RATE: 84 BPM | SYSTOLIC BLOOD PRESSURE: 112 MMHG | HEIGHT: 65 IN | BODY MASS INDEX: 34.53 KG/M2 | DIASTOLIC BLOOD PRESSURE: 76 MMHG | OXYGEN SATURATION: 98 % | TEMPERATURE: 100 F

## 2023-03-08 DIAGNOSIS — Z83.49 FAMILY HISTORY OF WILSON'S DISEASE: Primary | ICD-10-CM

## 2023-03-08 DIAGNOSIS — R41.840 ATTENTION DEFICIT: ICD-10-CM

## 2023-03-08 DIAGNOSIS — Z82.49 FAMILY HISTORY OF PREMATURE CAD: ICD-10-CM

## 2023-03-08 PROCEDURE — 3008F BODY MASS INDEX DOCD: CPT | Mod: CPTII,S$GLB,, | Performed by: FAMILY MEDICINE

## 2023-03-08 PROCEDURE — 99214 PR OFFICE/OUTPT VISIT, EST, LEVL IV, 30-39 MIN: ICD-10-PCS | Mod: S$GLB,,, | Performed by: FAMILY MEDICINE

## 2023-03-08 PROCEDURE — 99214 OFFICE O/P EST MOD 30 MIN: CPT | Mod: S$GLB,,, | Performed by: FAMILY MEDICINE

## 2023-03-08 PROCEDURE — 1159F MED LIST DOCD IN RCRD: CPT | Mod: CPTII,S$GLB,, | Performed by: FAMILY MEDICINE

## 2023-03-08 PROCEDURE — 3074F PR MOST RECENT SYSTOLIC BLOOD PRESSURE < 130 MM HG: ICD-10-PCS | Mod: CPTII,S$GLB,, | Performed by: FAMILY MEDICINE

## 2023-03-08 PROCEDURE — 3074F SYST BP LT 130 MM HG: CPT | Mod: CPTII,S$GLB,, | Performed by: FAMILY MEDICINE

## 2023-03-08 PROCEDURE — 3078F PR MOST RECENT DIASTOLIC BLOOD PRESSURE < 80 MM HG: ICD-10-PCS | Mod: CPTII,S$GLB,, | Performed by: FAMILY MEDICINE

## 2023-03-08 PROCEDURE — 3008F PR BODY MASS INDEX (BMI) DOCUMENTED: ICD-10-PCS | Mod: CPTII,S$GLB,, | Performed by: FAMILY MEDICINE

## 2023-03-08 PROCEDURE — 99999 PR PBB SHADOW E&M-EST. PATIENT-LVL IV: ICD-10-PCS | Mod: PBBFAC,,, | Performed by: FAMILY MEDICINE

## 2023-03-08 PROCEDURE — 99999 PR PBB SHADOW E&M-EST. PATIENT-LVL IV: CPT | Mod: PBBFAC,,, | Performed by: FAMILY MEDICINE

## 2023-03-08 PROCEDURE — 3078F DIAST BP <80 MM HG: CPT | Mod: CPTII,S$GLB,, | Performed by: FAMILY MEDICINE

## 2023-03-08 PROCEDURE — 1160F PR REVIEW ALL MEDS BY PRESCRIBER/CLIN PHARMACIST DOCUMENTED: ICD-10-PCS | Mod: CPTII,S$GLB,, | Performed by: FAMILY MEDICINE

## 2023-03-08 PROCEDURE — 1159F PR MEDICATION LIST DOCUMENTED IN MEDICAL RECORD: ICD-10-PCS | Mod: CPTII,S$GLB,, | Performed by: FAMILY MEDICINE

## 2023-03-08 PROCEDURE — 1160F RVW MEDS BY RX/DR IN RCRD: CPT | Mod: CPTII,S$GLB,, | Performed by: FAMILY MEDICINE

## 2023-03-08 RX ORDER — ROSUVASTATIN CALCIUM 10 MG/1
10 TABLET, COATED ORAL DAILY
Qty: 90 TABLET | Refills: 3 | Status: SHIPPED | OUTPATIENT
Start: 2023-03-08 | End: 2023-03-10

## 2023-03-08 NOTE — PROGRESS NOTES
Subjective:       Patient ID: Rebecca Shetty is a 33 y.o. female.    Chief Complaint: Follow-up    33 y old female here to discuss statin therapy for CAD prevention . Father had MI at 55 . She is a smoker . Also was recommended by genetics  to get tested for Piter's disease since brother has it . Lastly , she will like to be tested for ADD . Evaluated by psychiatry over  10 y ago after her mother passed. She was told she had ADD and Bipolar disorder.    Review of Systems   Constitutional: Negative.  Negative for activity change and unexpected weight change.   HENT: Negative.  Negative for hearing loss, rhinorrhea and trouble swallowing.    Eyes: Negative.  Negative for discharge and visual disturbance.   Respiratory: Negative.  Negative for chest tightness and wheezing.    Cardiovascular: Negative.  Negative for chest pain and palpitations.   Gastrointestinal: Negative.  Negative for blood in stool, constipation, diarrhea and vomiting.   Endocrine: Negative for polydipsia and polyuria.   Genitourinary: Negative.  Negative for difficulty urinating, dysuria, hematuria and menstrual problem.   Musculoskeletal: Negative.  Negative for arthralgias, joint swelling and neck pain.   Skin: Negative.    Neurological:  Negative for weakness and headaches.   Hematological: Negative.    Psychiatric/Behavioral:  Negative for confusion and dysphoric mood.      Objective:      Physical Exam  Constitutional:       General: She is not in acute distress.     Appearance: She is well-developed. She is not diaphoretic.   HENT:      Head: Normocephalic and atraumatic.      Right Ear: External ear normal.      Left Ear: External ear normal.      Mouth/Throat:      Pharynx: No oropharyngeal exudate.   Eyes:      General: No scleral icterus.        Right eye: No discharge.         Left eye: No discharge.      Conjunctiva/sclera: Conjunctivae normal.      Pupils: Pupils are equal, round, and reactive to light.   Neck:      Thyroid: No  thyromegaly.      Vascular: No JVD.      Trachea: No tracheal deviation.   Cardiovascular:      Rate and Rhythm: Normal rate and regular rhythm.      Heart sounds: Normal heart sounds. No murmur heard.    No friction rub. No gallop.   Pulmonary:      Effort: Pulmonary effort is normal. No respiratory distress.      Breath sounds: Normal breath sounds. No stridor. No wheezing or rales.   Chest:      Chest wall: No tenderness.   Abdominal:      General: Bowel sounds are normal. There is no distension.      Palpations: Abdomen is soft.      Tenderness: There is no abdominal tenderness. There is no guarding or rebound.   Musculoskeletal:         General: Normal range of motion.      Cervical back: Normal range of motion and neck supple.   Lymphadenopathy:      Cervical: No cervical adenopathy.   Skin:     General: Skin is warm and dry.   Neurological:      Mental Status: She is alert and oriented to person, place, and time.   Psychiatric:         Behavior: Behavior normal.         Thought Content: Thought content normal.         Judgment: Judgment normal.       Assessment:          Rebecca was seen today for follow-up.    Diagnoses and all orders for this visit:    Family history of Piter's disease  -     CERULOPLASMIN; Future  -     PROTIME-INR; Future  -     Copper, urine, 24 hour; Future    Attention deficit  -     Ambulatory referral/consult to Psychiatry; Future    Family history of premature CAD    Other orders  -     rosuvastatin (CRESTOR) 10 MG tablet; Take 1 tablet (10 mg total) by mouth once daily.       Plan:     Rebecca was seen today for follow-up.    Diagnoses and all orders for this visit:    Family history of Piter's disease  -     CERULOPLASMIN; Future  -     PROTIME-INR; Future  -     Copper, urine, 24 hour; Future    Attention deficit  -     Ambulatory referral/consult to Psychiatry; Future    Other orders  -     rosuvastatin (CRESTOR) 10 MG tablet; Take 1 tablet (10 mg total) by mouth once  daily.     We will continue to f.u   Psych   Crestor. Work on smoking cessation .   Ffabián in 3 m

## 2023-03-09 NOTE — TELEPHONE ENCOUNTER
Refill Routing Note   Medication(s) are not appropriate for processing by [facility] for the following reason(s):       drug on back order can we change to 20 mg and cut in half please send new rx. Please advise    [facility] action(s):  Defer         Appointments  past 12m or future 3m with PCP    Date Provider   Last Visit   3/8/2023 Terri Heaton MD   Next Visit   Visit date not found Terri Heaton MD   ED visits in past 90 days: 0        Note composed:5:56 PM 03/09/2023

## 2023-03-09 NOTE — TELEPHONE ENCOUNTER
No new care gaps identified.  [facility] Embedded Care Gaps. Reference number: [id number]. 3/09/2023   3:50:09 PM CST

## 2023-03-10 RX ORDER — ROSUVASTATIN CALCIUM 20 MG/1
TABLET, COATED ORAL
Qty: 90 TABLET | Refills: 0 | Status: SHIPPED | OUTPATIENT
Start: 2023-03-10 | End: 2023-04-04

## 2023-03-24 ENCOUNTER — ANESTHESIA EVENT (OUTPATIENT)
Dept: ENDOSCOPY | Facility: HOSPITAL | Age: 33
End: 2023-03-24
Payer: COMMERCIAL

## 2023-03-24 ENCOUNTER — ANESTHESIA (OUTPATIENT)
Dept: ENDOSCOPY | Facility: HOSPITAL | Age: 33
End: 2023-03-24
Payer: COMMERCIAL

## 2023-03-24 ENCOUNTER — HOSPITAL ENCOUNTER (OUTPATIENT)
Facility: HOSPITAL | Age: 33
Discharge: HOME | End: 2023-03-24
Attending: INTERNAL MEDICINE
Payer: COMMERCIAL

## 2023-03-24 DIAGNOSIS — K21.9 GERD (GASTROESOPHAGEAL REFLUX DISEASE): ICD-10-CM

## 2023-03-24 LAB
B-HCG UR QL: NEGATIVE
CTP QC/QA: YES

## 2023-03-24 PROCEDURE — 63600175 PHARM REV CODE 636 W HCPCS: Performed by: STUDENT IN AN ORGANIZED HEALTH CARE EDUCATION/TRAINING PROGRAM

## 2023-03-24 PROCEDURE — 27201012 HC FORCEPS, HOT/COLD, DISP: Performed by: INTERNAL MEDICINE

## 2023-03-24 PROCEDURE — 88305 TISSUE EXAM BY PATHOLOGIST: ICD-10-PCS | Mod: 26,,, | Performed by: PATHOLOGY

## 2023-03-24 PROCEDURE — 25000003 PHARM REV CODE 250: Performed by: STUDENT IN AN ORGANIZED HEALTH CARE EDUCATION/TRAINING PROGRAM

## 2023-03-24 PROCEDURE — 43239 EGD BIOPSY SINGLE/MULTIPLE: CPT | Performed by: INTERNAL MEDICINE

## 2023-03-24 PROCEDURE — 88305 TISSUE EXAM BY PATHOLOGIST: CPT | Performed by: PATHOLOGY

## 2023-03-24 PROCEDURE — 81025 URINE PREGNANCY TEST: CPT | Performed by: INTERNAL MEDICINE

## 2023-03-24 PROCEDURE — 43239 EGD BIOPSY SINGLE/MULTIPLE: CPT | Mod: ,,, | Performed by: INTERNAL MEDICINE

## 2023-03-24 PROCEDURE — 37000008 HC ANESTHESIA 1ST 15 MINUTES: Performed by: INTERNAL MEDICINE

## 2023-03-24 PROCEDURE — 43239 PR EGD, FLEX, W/BIOPSY, SGL/MULTI: ICD-10-PCS | Mod: ,,, | Performed by: INTERNAL MEDICINE

## 2023-03-24 PROCEDURE — 88305 TISSUE EXAM BY PATHOLOGIST: CPT | Mod: 26,,, | Performed by: PATHOLOGY

## 2023-03-24 RX ORDER — ONDANSETRON 2 MG/ML
INJECTION INTRAMUSCULAR; INTRAVENOUS
Status: DISCONTINUED | OUTPATIENT
Start: 2023-03-24 | End: 2023-03-24

## 2023-03-24 RX ORDER — DIPHENHYDRAMINE HYDROCHLORIDE 50 MG/ML
INJECTION INTRAMUSCULAR; INTRAVENOUS
Status: DISCONTINUED | OUTPATIENT
Start: 2023-03-24 | End: 2023-03-24

## 2023-03-24 RX ORDER — PROPOFOL 10 MG/ML
VIAL (ML) INTRAVENOUS
Status: DISCONTINUED | OUTPATIENT
Start: 2023-03-24 | End: 2023-03-24

## 2023-03-24 RX ORDER — LIDOCAINE HYDROCHLORIDE 10 MG/ML
INJECTION, SOLUTION EPIDURAL; INFILTRATION; INTRACAUDAL; PERINEURAL
Status: DISCONTINUED | OUTPATIENT
Start: 2023-03-24 | End: 2023-03-24

## 2023-03-24 RX ADMIN — LIDOCAINE HYDROCHLORIDE 50 MG: 10 INJECTION, SOLUTION EPIDURAL; INFILTRATION; INTRACAUDAL; PERINEURAL at 09:03

## 2023-03-24 RX ADMIN — GLYCOPYRROLATE 0.2 MG: 0.2 INJECTION, SOLUTION INTRAMUSCULAR; INTRAVITREAL at 09:03

## 2023-03-24 RX ADMIN — PROPOFOL 150 MG: 10 INJECTION, EMULSION INTRAVENOUS at 09:03

## 2023-03-24 RX ADMIN — ONDANSETRON 4 MG: 2 INJECTION INTRAMUSCULAR; INTRAVENOUS at 09:03

## 2023-03-24 RX ADMIN — DIPHENHYDRAMINE HYDROCHLORIDE 6.25 MG: 50 INJECTION, SOLUTION INTRAMUSCULAR; INTRAVENOUS at 09:03

## 2023-03-24 NOTE — DISCHARGE SUMMARY
[facility] End[facility] (Hospital)  Discharge Note  Short Stay    Procedure(s) (LRB):  EGD (ESOPHAGOGASTRODUODENOSCOPY) (N/A)      OUTCOME: Patient tolerated treatment/procedure well without complication and is now ready for discharge.    DISPOSITION: Home or Self Care    FINAL DIAGNOSIS:  <principal problem not specified>    FOLLOWUP: With primary care provider    DISCHARGE INSTRUCTIONS:  No discharge procedures on file.     TIME SPENT ON DISCHARGE: 20 minutes

## 2023-03-24 NOTE — TRANSFER OF CARE
"Anesthesia Transfer of Care Note    Patient: Rebecca Shetty    Procedure(s) Performed: Procedure(s) (LRB):  EGD (ESOPHAGOGASTRODUODENOSCOPY) (N/A)    Patient location: GI    Anesthesia Type: MAC    Transport from OR: Transported from OR on room air with adequate spontaneous ventilation    Post pain: adequate analgesia    Post assessment: no apparent anesthetic complications    Post vital signs: stable    Level of consciousness: awake and alert    Nausea/Vomiting: no nausea/vomiting    Complications: none    Transfer of care protocol was followed      Last vitals:   Visit Vitals  BP 96/62   Pulse 74   Temp 36.5 °C (97.7 °F) (Tympanic)   Resp 16   Ht 5' 5" (1.651 m)   Wt 93.9 kg (207 lb)   LMP  (LMP Unknown)   SpO2 95%   Breastfeeding No   BMI 34.45 kg/m²     "

## 2023-03-24 NOTE — ANESTHESIA PREPROCEDURE EVALUATION
03/24/2023  Rebecca Shetty is a 33 y.o., female.      Pre-op Assessment    I have reviewed the Patient Summary Reports.     I have reviewed the Nursing Notes. I have reviewed the NPO Status.   I have reviewed the Medications.     Review of Systems  Anesthesia Hx:  Denies Family Hx of Anesthesia complications.   Denies Personal Hx of Anesthesia complications.   Social:  Smoker    Cardiovascular:   Hypertension  Hypertension    Pulmonary:  Pulmonary Normal    Hepatic/GI:   GERD    Neurological:  Neurology Normal    Endocrine:  Obesity / BMI > 30      Physical Exam  General: Oriented, Alert, Cooperative and Well nourished    Airway:  Mallampati: II   Mouth Opening: Normal  TM Distance: Normal  Tongue: Normal    Dental:  Intact        Anesthesia Plan  Type of Anesthesia, risks & benefits discussed:    Anesthesia Type: MAC  Intra-op Monitoring Plan: Standard ASA Monitors  Post Op Pain Control Plan: IV/PO Opioids PRN  Induction:  IV  Informed Consent: Informed consent signed with the Patient and all parties understand the risks and agree with anesthesia plan.  All questions answered.   ASA Score: 2  Day of Surgery Review of History & Physical: H&P Update referred to the surgeon/provider.I have interviewed and examined the patient. I have reviewed the patient's H&P dated:     Ready For Surgery From Anesthesia Perspective.     .

## 2023-03-24 NOTE — [employer] PATIENT INSTRUCTIONS
Discharge Summary/Instructions after an Endoscopic Procedure  Patient Name: Rebecca Shetty  Patient MRN: [id number]  Patient YOB: 1990  Friday, March 24, 2023 Mina Rebolledo MD  Dear patient,  As a result of recent federal legislation (The Federal Cures Act), you may   receive lab or pathology results from your procedure in your [facility]   account before your physician is able to contact you. Your physician or   their representative will relay the results to you with their   recommendations at their soonest availability.  Thank you,  RESTRICTIONS:  During your procedure today, you received medications for sedation.  These   medications may affect your judgment, balance and coordination.  Therefore,   for 24 hours, you have the following restrictions:   - DO NOT drive a car, operate machinery, make legal/financial decisions,   sign important papers or drink alcohol.    ACTIVITY:  Today: no heavy lifting, straining or running due to procedural   sedation/anesthesia.  The following day: return to full activity including work.  DIET:  Eat and drink normally unless instructed otherwise.     TREATMENT FOR COMMON SIDE EFFECTS:  - Mild abdominal pain, nausea, belching, bloating or excessive gas:  rest,   eat lightly and use a heating pad.  - Sore Throat: treat with throat lozenges and/or gargle with warm salt   water.  - Because air was used during the procedure, expelling large amounts of air   from your rectum or belching is normal.  - If a bowel prep was taken, you may not have a bowel movement for 1-3 days.    This is normal.  SYMPTOMS TO WATCH FOR AND REPORT TO YOUR PHYSICIAN:  1. Abdominal pain or bloating, other than gas cramps.  2. Chest pain.  3. Back pain.  4. Signs of infection such as: chills or fever occurring within 24 hours   after the procedure.  5. Rectal bleeding, which would show as bright red, maroon, or black stools.   (A tablespoon of blood from the rectum is not serious, especially if    hemorrhoids are present.)  6. Vomiting.  7. Weakness or dizziness.  GO DIRECTLY TO THE NEAREST EMERGENCY ROOM IF YOU HAVE ANY OF THE FOLLOWING:      Difficulty breathing              Chills and/or fever over 101 F   Persistent vomiting and/or vomiting blood   Severe abdominal pain   Severe chest pain   Black, tarry stools   Bleeding- more than one tablespoon   Any other symptom or condition that you feel may need urgent attention  Your doctor recommends these additional instructions:  If any biopsies were taken, your doctors clinic will contact you in 1 to 2   weeks with any results.  - Discharge patient to home (via wheelchair).   - Resume previous diet.   - Continue present medications.   - Await pathology results.   - The findings and recommendations were discussed with the patient.   - Return to referring physician. Escalate the treatment or cosndier PH   study  For questions, problems or results please call your physician Mina Rebolledo MD at Work:  [phone]  If you have any questions about the above instructions, call the GI   department at [phone] or call the endoscopy unit at [phone]   from 7am until 3 pm.  [facility], EMERGENCY ROOM PHONE NUMBER:   [phone]  IF A COMPLICATION OR EMERGENCY SITUATION ARISES AND YOU ARE UNABLE TO REACH   YOUR PHYSICIAN - GO DIRECTLY TO THE EMERGENCY ROOM.  I have read or have had read to me these discharge instructions for my   procedure and have received a written copy.  I understand these   instructions and will follow-up with my physician if I have any questions.     __________________________________       _____________________________________  Nurse Signature                                          Patient/Designated   Responsible Party Signature  MD Mina Stone MD  3/24/2023 9:33:28 AM  This report has been verified and signed electronically.  Dear patient,  As a result of recent  federal legislation (The Federal Cures Act), you may   receive lab or pathology results from your procedure in your [facility]   account before your physician is able to contact you. Your physician or   their representative will relay the results to you with their   recommendations at their soonest availability.  Thank you,  PROVATION

## 2023-03-24 NOTE — ANESTHESIA POSTPROCEDURE EVALUATION
Anesthesia Post Evaluation    Patient: Rebecca Shetty    Procedure(s) Performed: Procedure(s) (LRB):  EGD (ESOPHAGOGASTRODUODENOSCOPY) (N/A)    Final Anesthesia Type: MAC      Patient location during evaluation: GI PACU  Patient participation: Yes- Able to Participate  Level of consciousness: sedated  Post-procedure vital signs: reviewed and stable  Pain management: adequate  Airway patency: patent  BYRON mitigation strategies: Multimodal analgesia  PONV status at discharge: No PONV  Anesthetic complications: no      Cardiovascular status: blood pressure returned to baseline  Respiratory status: unassisted  Hydration status: euvolemic  Follow-up not needed.          Vitals Value Taken Time   BP  03/24/23 0936   Temp  03/24/23 0936   Pulse  03/24/23 0936   Resp  03/24/23 0936   SpO2  03/24/23 0936         No case tracking events are documented in the log.      Pain/Mohamud Score: No data recorded

## 2023-03-24 NOTE — H&P
"Endoscopy History and Physical    PCP - Terri Heaton MD  Referring Physician - Dolores Bates PA-C  [id number] [facility] [place] [id number]      ASA - per anesthesia  Mallampati - per anesthesia  History of Anesthesia problems - no  Family history Anesthesia problems -  no   Plan of anesthesia - General    HPI  33 y.o. female    Planned Procedure: EGD  Diagnosis: GERD  Chief Complaint: Same as above        ROS:  Constitutional: No fevers, chills, No weight loss  CV: No chest pain  Pulm: No cough, No shortness of breath  GI: see HPI    Medical History:  has a past medical history of Gastroesophageal reflux disease without esophagitis (3/24/2023), Hypercholesteremia, and Hypertension.    Surgical History:  has a past surgical history that includes Tonsillectomy; Tympanostomy tube placement; Intrauterine device insertion (05/11/2022); and Colonoscopy (N/A, 11/30/2022).    Family History: family history includes Aneurysm in her maternal aunt; Breast cancer in an other family member; Cancer in her paternal grandfather; Diabetes in her father; Heart attack in her maternal grandmother and paternal grandmother; Heart disease (age of onset: 55) in her father; Hyperlipidemia in her father; Hypertension in her father; Other in her father and other family members; Pancreatic cancer (age of onset: 44) in her mother; Seizures in her father; Skin cancer in an other family member; Stomach cancer in her maternal grandfather; Piter's disease in her brother..    Social History:  reports that she has been smoking cigarettes. She has been smoking an average of 1 pack per day. She has never used smokeless tobacco. She reports current alcohol use. She reports that she does not use drugs.    Review of patient's allergies indicates:   Allergen Reactions    Ceclor [cefaclor] Other (See Comments)     "Really out of it"    Ciprofloxacin Other (See Comments)     Pt reports that it makes her feel disconnected "       Medications:   Facility-Administered Medications Prior to Admission   Medication Dose Route Frequency Provider Last Rate Last Admin    albuterol inhaler 2 puff  2 puff Inhalation Q20 Min PRN Fabiano Lopez MD        levonorgestreL (KYLEENA) 17.5 mcg/24 hrs (5 yrs) 19.5 mg IUD 17.5 mcg  17.5 mcg Intrauterine  Lucy House MD   17.5 mcg at 05/11/22 1030     Medications Prior to Admission   Medication Sig Dispense Refill Last Dose    linaCLOtide (LINZESS) 145 mcg Cap capsule Take 1 capsule (145 mcg total) by mouth before breakfast. 30 capsule 2 3/23/2023    pantoprazole (PROTONIX) 40 MG tablet Take 1 tablet (40 mg total) by mouth once daily. 30 tablet 11 3/23/2023    rosuvastatin (CRESTOR) 20 MG tablet 1/2 tab po qhs 90 tablet 0 Past Month       Physical Exam:    Vital Signs:   Vitals:    03/24/23 0824   BP: 96/62   Pulse:    Resp:    Temp:        General Appearance: Well appearing in no acute distress  Abdomen: Soft, non tender, non distended with normal bowel sounds, no masses    Labs:  Lab Results   Component Value Date    WBC 8.50 10/27/2022    HGB 15.4 10/27/2022    HCT 45.8 10/27/2022     10/27/2022    CHOL 208 (H) 10/27/2022    TRIG 73 10/27/2022    HDL 49 10/27/2022    ALT 16 10/27/2022    AST 16 10/27/2022     10/27/2022    K 3.9 10/27/2022     10/27/2022    CREATININE 0.7 10/27/2022    BUN 10 10/27/2022    CO2 22 (L) 10/27/2022    TSH 1.263 10/27/2022    HGBA1C 5.1 10/27/2022       I have explained the risks and benefits of this endoscopic procedure to the patient including but not limited to bleeding, inflammation, infection, perforation, and death.    SEDATION PLAN: per anesthesia       History reviewed, vital signs satisfactory, cardiopulmonary status satisfactory, sedation options, risks and plans have been discussed with the patient  All their questions were answered and the patient agrees to the sedation procedures as planned and the patient is deemed an appropriate  candidate for the sedation as planned.     The risks, benefits and alternatives of the procedure were discussed with the patient in detail. This discussion was had in the presence of endoscopy staff. The risks include, risks of adverse reaction to sedation requiring the use of reversal agents, bleeding requiring blood transfusion, perforation requiring surgical intervention and technical failure. Other risks include aspiration leading to respiratory distress and respiratory failure resulting in endotracheal intubation and mechanical ventilation including death. If anesthesia is being utilized for this procedure, it is up to the anesthesiologist to determine airway safety including elective endotracheal intubation. Questions were answered, they agree to proceed. There was no language barriers.       Procedure explained to patient, informed consent obtained and placed in chart.       Mina Rebolledo MD

## 2023-03-28 VITALS
OXYGEN SATURATION: 99 % | DIASTOLIC BLOOD PRESSURE: 66 MMHG | HEIGHT: 65 IN | HEART RATE: 68 BPM | RESPIRATION RATE: 18 BRPM | BODY MASS INDEX: 34.49 KG/M2 | TEMPERATURE: 98 F | WEIGHT: 207 LBS | SYSTOLIC BLOOD PRESSURE: 98 MMHG

## 2023-03-29 LAB
FINAL PATHOLOGIC DIAGNOSIS: NORMAL
Lab: NORMAL

## 2023-04-04 ENCOUNTER — PATIENT MESSAGE (OUTPATIENT)
Dept: FAMILY MEDICINE | Facility: CLINIC | Age: 33
End: 2023-04-04
Payer: COMMERCIAL

## 2023-04-04 RX ORDER — ROSUVASTATIN CALCIUM 5 MG/1
5 TABLET, COATED ORAL DAILY
Qty: 30 TABLET | Refills: 0 | Status: SHIPPED | OUTPATIENT
Start: 2023-04-04 | End: 2023-05-11 | Stop reason: SDUPTHER

## 2023-04-12 ENCOUNTER — LAB VISIT (OUTPATIENT)
Dept: LAB | Facility: HOSPITAL | Age: 33
End: 2023-04-12
Attending: FAMILY MEDICINE
Payer: COMMERCIAL

## 2023-04-12 DIAGNOSIS — Z83.49 FAMILY HISTORY OF WILSON'S DISEASE: ICD-10-CM

## 2023-04-12 LAB
INR PPP: 1.1 (ref 0.8–1.2)
PROTHROMBIN TIME: 11.1 SEC (ref 9–12.5)

## 2023-04-12 PROCEDURE — 85610 PROTHROMBIN TIME: CPT | Performed by: FAMILY MEDICINE

## 2023-04-12 PROCEDURE — 82390 ASSAY OF CERULOPLASMIN: CPT | Performed by: FAMILY MEDICINE

## 2023-04-12 PROCEDURE — 36415 COLL VENOUS BLD VENIPUNCTURE: CPT | Mod: PO | Performed by: FAMILY MEDICINE

## 2023-04-13 LAB — CERULOPLASMIN SERPL-MCNC: 23 MG/DL (ref 15–45)

## 2023-05-11 ENCOUNTER — PATIENT MESSAGE (OUTPATIENT)
Dept: FAMILY MEDICINE | Facility: CLINIC | Age: 33
End: 2023-05-11

## 2023-05-11 ENCOUNTER — OFFICE VISIT (OUTPATIENT)
Dept: FAMILY MEDICINE | Facility: CLINIC | Age: 33
End: 2023-05-11
Attending: FAMILY MEDICINE
Payer: COMMERCIAL

## 2023-05-11 ENCOUNTER — CLINICAL SUPPORT (OUTPATIENT)
Dept: CARDIOLOGY | Facility: CLINIC | Age: 33
End: 2023-05-11
Attending: FAMILY MEDICINE
Payer: COMMERCIAL

## 2023-05-11 ENCOUNTER — HOSPITAL ENCOUNTER (OUTPATIENT)
Dept: RADIOLOGY | Facility: HOSPITAL | Age: 33
Discharge: HOME | End: 2023-05-11
Attending: FAMILY MEDICINE
Payer: COMMERCIAL

## 2023-05-11 VITALS
WEIGHT: 201.94 LBS | OXYGEN SATURATION: 98 % | SYSTOLIC BLOOD PRESSURE: 110 MMHG | HEART RATE: 72 BPM | TEMPERATURE: 99 F | DIASTOLIC BLOOD PRESSURE: 74 MMHG | BODY MASS INDEX: 33.65 KG/M2 | HEIGHT: 65 IN

## 2023-05-11 DIAGNOSIS — R07.2 PRECORDIAL PAIN: Primary | ICD-10-CM

## 2023-05-11 DIAGNOSIS — Z82.49 FAMILY HISTORY OF PREMATURE CORONARY ARTERY DISEASE: ICD-10-CM

## 2023-05-11 DIAGNOSIS — F90.0 ATTENTION DEFICIT HYPERACTIVITY DISORDER (ADHD), PREDOMINANTLY INATTENTIVE TYPE: ICD-10-CM

## 2023-05-11 DIAGNOSIS — K58.1 IRRITABLE BOWEL SYNDROME WITH CONSTIPATION: ICD-10-CM

## 2023-05-11 DIAGNOSIS — M79.10 MYALGIA: ICD-10-CM

## 2023-05-11 DIAGNOSIS — E78.5 DYSLIPIDEMIA: Primary | ICD-10-CM

## 2023-05-11 DIAGNOSIS — R07.9 CHEST PAIN, UNSPECIFIED TYPE: ICD-10-CM

## 2023-05-11 PROCEDURE — 99999 PR PBB SHADOW E&M-EST. PATIENT-LVL IV: CPT | Mod: PBBFAC,,, | Performed by: FAMILY MEDICINE

## 2023-05-11 PROCEDURE — 1160F PR REVIEW ALL MEDS BY PRESCRIBER/CLIN PHARMACIST DOCUMENTED: ICD-10-PCS | Mod: CPTII,S$GLB,, | Performed by: FAMILY MEDICINE

## 2023-05-11 PROCEDURE — 99999 PR PBB SHADOW E&M-EST. PATIENT-LVL I: ICD-10-PCS | Mod: PBBFAC,,,

## 2023-05-11 PROCEDURE — 99214 PR OFFICE/OUTPT VISIT, EST, LEVL IV, 30-39 MIN: ICD-10-PCS | Mod: S$GLB,,, | Performed by: FAMILY MEDICINE

## 2023-05-11 PROCEDURE — 71046 X-RAY EXAM CHEST 2 VIEWS: CPT | Mod: 26,,, | Performed by: RADIOLOGY

## 2023-05-11 PROCEDURE — 71046 X-RAY EXAM CHEST 2 VIEWS: CPT | Mod: TC,PO

## 2023-05-11 PROCEDURE — 3078F DIAST BP <80 MM HG: CPT | Mod: CPTII,S$GLB,, | Performed by: FAMILY MEDICINE

## 2023-05-11 PROCEDURE — 3008F PR BODY MASS INDEX (BMI) DOCUMENTED: ICD-10-PCS | Mod: CPTII,S$GLB,, | Performed by: FAMILY MEDICINE

## 2023-05-11 PROCEDURE — 71046 XR CHEST PA AND LATERAL: ICD-10-PCS | Mod: 26,,, | Performed by: RADIOLOGY

## 2023-05-11 PROCEDURE — 93010 EKG 12-LEAD: ICD-10-PCS | Mod: S$GLB,,, | Performed by: INTERNAL MEDICINE

## 2023-05-11 PROCEDURE — 1159F MED LIST DOCD IN RCRD: CPT | Mod: CPTII,S$GLB,, | Performed by: FAMILY MEDICINE

## 2023-05-11 PROCEDURE — 99214 OFFICE O/P EST MOD 30 MIN: CPT | Mod: S$GLB,,, | Performed by: FAMILY MEDICINE

## 2023-05-11 PROCEDURE — 93005 ELECTROCARDIOGRAM TRACING: CPT | Mod: S$GLB,,, | Performed by: FAMILY MEDICINE

## 2023-05-11 PROCEDURE — 3074F PR MOST RECENT SYSTOLIC BLOOD PRESSURE < 130 MM HG: ICD-10-PCS | Mod: CPTII,S$GLB,, | Performed by: FAMILY MEDICINE

## 2023-05-11 PROCEDURE — 1160F RVW MEDS BY RX/DR IN RCRD: CPT | Mod: CPTII,S$GLB,, | Performed by: FAMILY MEDICINE

## 2023-05-11 PROCEDURE — 99999 PR PBB SHADOW E&M-EST. PATIENT-LVL I: CPT | Mod: PBBFAC,,,

## 2023-05-11 PROCEDURE — 99999 PR PBB SHADOW E&M-EST. PATIENT-LVL IV: ICD-10-PCS | Mod: PBBFAC,,, | Performed by: FAMILY MEDICINE

## 2023-05-11 PROCEDURE — 3074F SYST BP LT 130 MM HG: CPT | Mod: CPTII,S$GLB,, | Performed by: FAMILY MEDICINE

## 2023-05-11 PROCEDURE — 1159F PR MEDICATION LIST DOCUMENTED IN MEDICAL RECORD: ICD-10-PCS | Mod: CPTII,S$GLB,, | Performed by: FAMILY MEDICINE

## 2023-05-11 PROCEDURE — 93005 EKG 12-LEAD: ICD-10-PCS | Mod: S$GLB,,, | Performed by: FAMILY MEDICINE

## 2023-05-11 PROCEDURE — 3008F BODY MASS INDEX DOCD: CPT | Mod: CPTII,S$GLB,, | Performed by: FAMILY MEDICINE

## 2023-05-11 PROCEDURE — 3078F PR MOST RECENT DIASTOLIC BLOOD PRESSURE < 80 MM HG: ICD-10-PCS | Mod: CPTII,S$GLB,, | Performed by: FAMILY MEDICINE

## 2023-05-11 PROCEDURE — 93010 ELECTROCARDIOGRAM REPORT: CPT | Mod: S$GLB,,, | Performed by: INTERNAL MEDICINE

## 2023-05-11 RX ORDER — ROSUVASTATIN CALCIUM 5 MG/1
5 TABLET, COATED ORAL DAILY
Qty: 90 TABLET | Refills: 1 | Status: SHIPPED | OUTPATIENT
Start: 2023-05-11 | End: 2023-05-31

## 2023-05-11 NOTE — PROGRESS NOTES
Subjective:       Patient ID: Rebecca Shetty is a 33 y.o. female.    Chief Complaint: Follow-up    33 y old female here for f.u . Doing well except for a 1 m hx of L side cp while at rest, last few min . She tries to cough and gets sob when this occur. Also having random muscle cramps. Normal BP . Continues to smoke . Lastly , will like to be tested for ADD  , she is all over the place at work and at home . She was seen by psych at some point and was told she needed lithium ,. She does have BPD . She has had panic attacks but this  chest pain is not like a panic attack pain .     Review of Systems   Constitutional:  Positive for activity change. Negative for unexpected weight change.   HENT:  Positive for rhinorrhea. Negative for hearing loss and trouble swallowing.    Eyes: Negative.  Negative for discharge and visual disturbance.   Respiratory: Negative.  Negative for chest tightness and wheezing.    Cardiovascular:  Positive for chest pain. Negative for palpitations.   Gastrointestinal:  Positive for diarrhea. Negative for blood in stool, constipation and vomiting.   Endocrine: Negative for polydipsia and polyuria.   Genitourinary: Negative.  Negative for difficulty urinating, dysuria, hematuria and menstrual problem.   Musculoskeletal:  Positive for arthralgias and neck pain. Negative for joint swelling.   Skin: Negative.    Neurological:  Positive for headaches. Negative for weakness.   Hematological: Negative.    Psychiatric/Behavioral:  Negative for confusion and dysphoric mood.      Objective:      Physical Exam  Constitutional:       General: She is not in acute distress.     Appearance: She is well-developed. She is not diaphoretic.   HENT:      Head: Normocephalic and atraumatic.      Right Ear: External ear normal.      Left Ear: External ear normal.      Mouth/Throat:      Pharynx: No oropharyngeal exudate.   Eyes:      General: No scleral icterus.        Right eye: No discharge.         Left eye:  No discharge.      Conjunctiva/sclera: Conjunctivae normal.      Pupils: Pupils are equal, round, and reactive to light.   Neck:      Thyroid: No thyromegaly.      Vascular: No JVD.      Trachea: No tracheal deviation.   Cardiovascular:      Rate and Rhythm: Normal rate and regular rhythm.      Heart sounds: Normal heart sounds. No murmur heard.    No friction rub. No gallop.   Pulmonary:      Effort: Pulmonary effort is normal. No respiratory distress.      Breath sounds: Normal breath sounds. No stridor. No wheezing or rales.   Chest:      Chest wall: No tenderness.   Abdominal:      General: Bowel sounds are normal. There is no distension.      Palpations: Abdomen is soft.      Tenderness: There is no abdominal tenderness. There is no guarding or rebound.   Musculoskeletal:         General: Normal range of motion.      Cervical back: Normal range of motion and neck supple.   Lymphadenopathy:      Cervical: No cervical adenopathy.   Skin:     General: Skin is warm and dry.   Neurological:      Mental Status: She is alert and oriented to person, place, and time.   Psychiatric:         Behavior: Behavior normal.         Thought Content: Thought content normal.         Judgment: Judgment normal.       Assessment:       1. Dyslipidemia    2. Family history of premature coronary artery disease    3. Irritable bowel syndrome with constipation    4. Attention deficit hyperactivity disorder (ADHD), predominantly inattentive type    5. Chest pain, unspecified type    6. Myalgia        Plan:     Rebecca was seen today for follow-up.    Diagnoses and all orders for this visit:    Dyslipidemia  -     CBC Auto Differential; Future  -     Comprehensive Metabolic Panel; Future  -     Hemoglobin A1C; Future  -     Lipid Panel; Future    Family history of premature coronary artery disease    Irritable bowel syndrome with constipation    Attention deficit hyperactivity disorder (ADHD), predominantly inattentive type  -      Ambulatory referral/consult to Psychiatry; Future    Chest pain, unspecified type  -     X-Ray Chest PA And Lateral; Future  -     IN OFFICE EKG 12-LEAD (to Muse)    Myalgia  -     CK; Future    Other orders  -     linaCLOtide (LINZESS) 72 mcg Cap capsule; Take 1 capsule (72 mcg total) by mouth before breakfast.  -     rosuvastatin (CRESTOR) 5 MG tablet; Take 1 tablet (5 mg total) by mouth once daily.     Hold off on taking Crestor for 1 w to assess if myalgias are caused by statin   Stress test ?   Stable   Psych ref   WS discussed    Work on smoking cessation

## 2023-05-12 ENCOUNTER — TELEPHONE (OUTPATIENT)
Dept: CARDIOLOGY | Facility: CLINIC | Age: 33
End: 2023-05-12
Payer: COMMERCIAL

## 2023-05-12 NOTE — TELEPHONE ENCOUNTER
Pt cancelled and rescheduled pb----- Message from Katina Mario sent at 5/12/2023  2:54 PM CDT -----  Contact: Rebecca  Rebecca is needing a call back in regards to rescheduling her stress test to a later time that same day. Please give her a call back at [phone]

## 2023-05-19 ENCOUNTER — PATIENT MESSAGE (OUTPATIENT)
Dept: FAMILY MEDICINE | Facility: CLINIC | Age: 33
End: 2023-05-19
Payer: COMMERCIAL

## 2023-05-30 ENCOUNTER — TELEPHONE (OUTPATIENT)
Dept: CARDIOLOGY | Facility: CLINIC | Age: 33
End: 2023-05-30
Payer: COMMERCIAL

## 2023-05-30 NOTE — TELEPHONE ENCOUNTER
Spoke with pt in regards to her clarifying if she will be able to complete her treadmill stress test due to having some respiratory issues.           ----- Message from Cristina Espinoza sent at 5/30/2023  9:32 AM CDT -----  Name of Who is Calling: Patient           What is the request in detail: Patient is calling to confirm whether she can complete her scheduled stress test while sick? Please advise.           Can the clinic reply by [facility]: No           What Number to Call Back if not in [facility]: [phone]      Statement Selected

## 2023-05-31 ENCOUNTER — HOSPITAL ENCOUNTER (OUTPATIENT)
Dept: CARDIOLOGY | Facility: HOSPITAL | Age: 33
Discharge: HOME | End: 2023-05-31
Attending: INTERNAL MEDICINE
Payer: COMMERCIAL

## 2023-05-31 ENCOUNTER — OFFICE VISIT (OUTPATIENT)
Dept: CARDIOLOGY | Facility: CLINIC | Age: 33
End: 2023-05-31
Attending: FAMILY MEDICINE
Payer: COMMERCIAL

## 2023-05-31 ENCOUNTER — HOSPITAL ENCOUNTER (OUTPATIENT)
Dept: CARDIOLOGY | Facility: HOSPITAL | Age: 33
Discharge: HOME | End: 2023-05-31
Attending: FAMILY MEDICINE
Payer: COMMERCIAL

## 2023-05-31 VITALS
BODY MASS INDEX: 34.27 KG/M2 | DIASTOLIC BLOOD PRESSURE: 72 MMHG | SYSTOLIC BLOOD PRESSURE: 118 MMHG | WEIGHT: 205.69 LBS | HEART RATE: 74 BPM | OXYGEN SATURATION: 97 % | HEIGHT: 65 IN

## 2023-05-31 VITALS — WEIGHT: 205 LBS | HEIGHT: 65 IN | BODY MASS INDEX: 34.16 KG/M2

## 2023-05-31 DIAGNOSIS — Z82.49 FAMILY HISTORY OF PREMATURE CAD: ICD-10-CM

## 2023-05-31 DIAGNOSIS — Z72.0 TOBACCO ABUSE: ICD-10-CM

## 2023-05-31 DIAGNOSIS — R00.2 PALPITATION: ICD-10-CM

## 2023-05-31 DIAGNOSIS — R07.89 OTHER CHEST PAIN: Primary | ICD-10-CM

## 2023-05-31 DIAGNOSIS — R07.89 OTHER CHEST PAIN: ICD-10-CM

## 2023-05-31 DIAGNOSIS — R07.2 PRECORDIAL PAIN: ICD-10-CM

## 2023-05-31 DIAGNOSIS — R42 DIZZINESS: ICD-10-CM

## 2023-05-31 LAB
AORTIC ROOT ANNULUS: 2.64 CM
AV INDEX (PROSTH): 0.66
AV MEAN GRADIENT: 3 MMHG
AV PEAK GRADIENT: 5 MMHG
AV VALVE AREA: 2.08 CM2
AV VELOCITY RATIO: 0.66
BSA FOR ECHO PROCEDURE: 2.06 M2
CV ECHO LV RWT: 0.46 CM
CV STRESS BASE HR: 75 BPM
DIASTOLIC BLOOD PRESSURE: 66 MMHG
DOP CALC AO PEAK VEL: 1.16 M/S
DOP CALC AO VTI: 24.5 CM
DOP CALC LVOT AREA: 3.1 CM2
DOP CALC LVOT DIAMETER: 2 CM
DOP CALC LVOT PEAK VEL: 0.76 M/S
DOP CALC LVOT STROKE VOLUME: 50.87 CM3
DOP CALC RVOT PEAK VEL: 0.64 M/S
DOP CALC RVOT VTI: 16.5 CM
DOP CALCLVOT PEAK VEL VTI: 16.2 CM
E WAVE DECELERATION TIME: 177.17 MSEC
E/A RATIO: 1.47
E/E' RATIO: 6 M/S
ECHO LV POSTERIOR WALL: 1.02 CM (ref 0.6–1.1)
EJECTION FRACTION: 55 %
FRACTIONAL SHORTENING: 32 % (ref 28–44)
INTERVENTRICULAR SEPTUM: 0.78 CM (ref 0.6–1.1)
IVRT: 82.78 MSEC
LA MAJOR: 4.54 CM
LA MINOR: 4.24 CM
LA WIDTH: 2.9 CM
LEFT ATRIUM SIZE: 3.22 CM
LEFT ATRIUM VOLUME INDEX MOD: 17.7 ML/M2
LEFT ATRIUM VOLUME INDEX: 17.4 ML/M2
LEFT ATRIUM VOLUME MOD: 35.43 CM3
LEFT ATRIUM VOLUME: 34.8 CM3
LEFT INTERNAL DIMENSION IN SYSTOLE: 3.02 CM (ref 2.1–4)
LEFT VENTRICLE DIASTOLIC VOLUME INDEX: 45.32 ML/M2
LEFT VENTRICLE DIASTOLIC VOLUME: 90.63 ML
LEFT VENTRICLE MASS INDEX: 65 G/M2
LEFT VENTRICLE SYSTOLIC VOLUME INDEX: 17.9 ML/M2
LEFT VENTRICLE SYSTOLIC VOLUME: 35.71 ML
LEFT VENTRICULAR INTERNAL DIMENSION IN DIASTOLE: 4.46 CM (ref 3.5–6)
LEFT VENTRICULAR MASS: 130.89 G
LV LATERAL E/E' RATIO: 5.77 M/S
LV SEPTAL E/E' RATIO: 6.25 M/S
LVOT MG: 1.25 MMHG
LVOT MV: 0.54 CM/S
MV PEAK A VEL: 0.51 M/S
MV PEAK E VEL: 0.75 M/S
OHS CV CPX 1 MINUTE RECOVERY HEART RATE: 137 BPM
OHS CV CPX 85 PERCENT MAX PREDICTED HEART RATE MALE: 150
OHS CV CPX ESTIMATED METS: 10
OHS CV CPX MAX PREDICTED HEART RATE: 176
OHS CV CPX PATIENT IS FEMALE: 1
OHS CV CPX PATIENT IS MALE: 0
OHS CV CPX PEAK DIASTOLIC BLOOD PRESSURE: 75 MMHG
OHS CV CPX PEAK HEAR RATE: 166 BPM
OHS CV CPX PEAK RATE PRESSURE PRODUCT: NORMAL
OHS CV CPX PEAK SYSTOLIC BLOOD PRESSURE: 167 MMHG
OHS CV CPX PERCENT MAX PREDICTED HEART RATE ACHIEVED: 94
OHS CV CPX RATE PRESSURE PRODUCT PRESENTING: 8100
PISA TR MAX VEL: 2.58 M/S
PULM VEIN S/D RATIO: 1.27
PV MEAN GRADIENT: 1.03 MMHG
PV PEAK D VEL: 0.52 M/S
PV PEAK S VEL: 0.66 M/S
PV PEAK VELOCITY: 0.83 CM/S
RA MAJOR: 4.5 CM
RA PRESSURE: 3 MMHG
RA WIDTH: 2.93 CM
RIGHT VENTRICULAR END-DIASTOLIC DIMENSION: 2.08 CM
SINUS: 2.37 CM
STJ: 2.28 CM
STRESS ECHO POST EXERCISE DUR MIN: 8 MINUTES
STRESS ECHO POST EXERCISE DUR SEC: 4 SECONDS
STRESS ST DEPRESSION: 0.5 MM
SYSTOLIC BLOOD PRESSURE: 108 MMHG
TDI LATERAL: 0.13 M/S
TDI SEPTAL: 0.12 M/S
TDI: 0.13 M/S
TR MAX PG: 27 MMHG
TV REST PULMONARY ARTERY PRESSURE: 30 MMHG

## 2023-05-31 PROCEDURE — 99204 OFFICE O/P NEW MOD 45 MIN: CPT | Mod: S$GLB,,, | Performed by: INTERNAL MEDICINE

## 2023-05-31 PROCEDURE — 93306 TTE W/DOPPLER COMPLETE: CPT | Mod: 26,,, | Performed by: INTERNAL MEDICINE

## 2023-05-31 PROCEDURE — 3078F DIAST BP <80 MM HG: CPT | Mod: CPTII,S$GLB,, | Performed by: INTERNAL MEDICINE

## 2023-05-31 PROCEDURE — 93016 EXERCISE STRESS - EKG (CUPID ONLY): ICD-10-PCS | Mod: ,,, | Performed by: INTERNAL MEDICINE

## 2023-05-31 PROCEDURE — 93018 EXERCISE STRESS - EKG (CUPID ONLY): ICD-10-PCS | Mod: ,,, | Performed by: INTERNAL MEDICINE

## 2023-05-31 PROCEDURE — 93306 TTE W/DOPPLER COMPLETE: CPT

## 2023-05-31 PROCEDURE — 93306 ECHO (CUPID ONLY): ICD-10-PCS | Mod: 26,,, | Performed by: INTERNAL MEDICINE

## 2023-05-31 PROCEDURE — 3008F PR BODY MASS INDEX (BMI) DOCUMENTED: ICD-10-PCS | Mod: CPTII,S$GLB,, | Performed by: INTERNAL MEDICINE

## 2023-05-31 PROCEDURE — 3044F HG A1C LEVEL LT 7.0%: CPT | Mod: CPTII,S$GLB,, | Performed by: INTERNAL MEDICINE

## 2023-05-31 PROCEDURE — 3078F PR MOST RECENT DIASTOLIC BLOOD PRESSURE < 80 MM HG: ICD-10-PCS | Mod: CPTII,S$GLB,, | Performed by: INTERNAL MEDICINE

## 2023-05-31 PROCEDURE — 1160F RVW MEDS BY RX/DR IN RCRD: CPT | Mod: CPTII,S$GLB,, | Performed by: INTERNAL MEDICINE

## 2023-05-31 PROCEDURE — 3008F BODY MASS INDEX DOCD: CPT | Mod: CPTII,S$GLB,, | Performed by: INTERNAL MEDICINE

## 2023-05-31 PROCEDURE — 1159F PR MEDICATION LIST DOCUMENTED IN MEDICAL RECORD: ICD-10-PCS | Mod: CPTII,S$GLB,, | Performed by: INTERNAL MEDICINE

## 2023-05-31 PROCEDURE — 3044F PR MOST RECENT HEMOGLOBIN A1C LEVEL <7.0%: ICD-10-PCS | Mod: CPTII,S$GLB,, | Performed by: INTERNAL MEDICINE

## 2023-05-31 PROCEDURE — 1159F MED LIST DOCD IN RCRD: CPT | Mod: CPTII,S$GLB,, | Performed by: INTERNAL MEDICINE

## 2023-05-31 PROCEDURE — 1160F PR REVIEW ALL MEDS BY PRESCRIBER/CLIN PHARMACIST DOCUMENTED: ICD-10-PCS | Mod: CPTII,S$GLB,, | Performed by: INTERNAL MEDICINE

## 2023-05-31 PROCEDURE — 99999 PR PBB SHADOW E&M-EST. PATIENT-LVL IV: ICD-10-PCS | Mod: PBBFAC,,, | Performed by: INTERNAL MEDICINE

## 2023-05-31 PROCEDURE — 99999 PR PBB SHADOW E&M-EST. PATIENT-LVL IV: CPT | Mod: PBBFAC,,, | Performed by: INTERNAL MEDICINE

## 2023-05-31 PROCEDURE — 93018 CV STRESS TEST I&R ONLY: CPT | Mod: ,,, | Performed by: INTERNAL MEDICINE

## 2023-05-31 PROCEDURE — 3074F PR MOST RECENT SYSTOLIC BLOOD PRESSURE < 130 MM HG: ICD-10-PCS | Mod: CPTII,S$GLB,, | Performed by: INTERNAL MEDICINE

## 2023-05-31 PROCEDURE — 93017 CV STRESS TEST TRACING ONLY: CPT

## 2023-05-31 PROCEDURE — 3074F SYST BP LT 130 MM HG: CPT | Mod: CPTII,S$GLB,, | Performed by: INTERNAL MEDICINE

## 2023-05-31 PROCEDURE — 99204 PR OFFICE/OUTPT VISIT, NEW, LEVL IV, 45-59 MIN: ICD-10-PCS | Mod: S$GLB,,, | Performed by: INTERNAL MEDICINE

## 2023-05-31 PROCEDURE — 93016 CV STRESS TEST SUPVJ ONLY: CPT | Mod: ,,, | Performed by: INTERNAL MEDICINE

## 2023-05-31 NOTE — PROGRESS NOTES
Subjective:   Patient ID:  Rebecca Shetty is a 33 y.o. female who presents for evaluation of Shortness of Breath (Notices sob when exerting/Has been having respiratory issues for about a week now) and Chest Pain        34 yo female, referred for chest pain and abnl EKG per Dr. Elizondo.    PMh f/h premature CAD, HTN HLD and GERD. Smoking and drinking occasionally  C/o chest pain, on left side, over one yr, once a week, lasted hours or whole day, sharp or pressure, triggered by emotional stress. ETT scheduled today per PCP  Occasional exercise  Few episodes of close faint with palpitation and skipping beats  Ekg in  NSR twi in V1 and V2 and V3  Father had PCI at 58, mother  at early age    No results found for this or any previous visit.     No results found for this or any previous visit.       Past Medical History:   Diagnosis Date    Gastroesophageal reflux disease without esophagitis 3/24/2023    Hypercholesteremia     Hypertension        Past Surgical History:   Procedure Laterality Date    COLONOSCOPY N/A 2022    Procedure: COLONOSCOPY;  Surgeon: Dannie Subramanian MD;  Location: [facility];  Service: General;  Laterality: N/A;    ESOPHAGOGASTRODUODENOSCOPY N/A 3/24/2023    Procedure: EGD (ESOPHAGOGASTRODUODENOSCOPY);  Surgeon: Mina Rebolledo MD;  Location: [facility];  Service: Endoscopy;  Laterality: N/A;    INTRAUTERINE DEVICE INSERTION  2022    Kyleena    TONSILLECTOMY      TYMPANOSTOMY TUBE PLACEMENT         Social History     Tobacco Use    Smoking status: Every Day     Packs/day: 1.00     Types: Cigarettes    Smokeless tobacco: Never   Substance Use Topics    Alcohol use: Yes     Comment: rarely    Drug use: No       Family History   Problem Relation Age of Onset    Pancreatic cancer Mother 44        chemo, surgery    Other Father         [place]ge-Nguyen    Heart disease Father 55        CAD    Seizures Father     Diabetes Father     Hypertension Father      Hyperlipidemia Father     Piter's disease Brother     Aneurysm Maternal Aunt         brain    Heart attack Maternal Grandmother     Stomach cancer Maternal Grandfather         possibly metastasis to brain    Heart attack Paternal Grandmother     Cancer Paternal Grandfather         type uk    Other Other         benign mass in arm    Other Other         blood disorder? (has to have blood filtered)    Breast cancer Other         metastasis to lungs    Skin cancer Other         type uk    COPD Neg Hx        Review of Systems   Constitutional: Negative for decreased appetite, diaphoresis, fever, malaise/fatigue and night sweats.   HENT:  Negative for nosebleeds.    Eyes:  Negative for blurred vision and double vision.   Cardiovascular:  Positive for chest pain, near-syncope and palpitations. Negative for claudication, dyspnea on exertion, irregular heartbeat, leg swelling, orthopnea, paroxysmal nocturnal dyspnea and syncope.   Respiratory:  Negative for cough, shortness of breath, sleep disturbances due to breathing, snoring, sputum production and wheezing.    Endocrine: Negative for cold intolerance and polyuria.   Hematologic/Lymphatic: Does not bruise/bleed easily.   Skin:  Negative for rash.   Musculoskeletal:  Negative for back pain, falls, joint pain, joint swelling and neck pain.   Gastrointestinal:  Negative for abdominal pain, heartburn, nausea and vomiting.   Genitourinary:  Negative for dysuria, frequency and hematuria.   Neurological:  Negative for difficulty with concentration, dizziness, focal weakness, headaches, light-headedness, numbness, seizures and weakness.   Psychiatric/Behavioral:  Negative for depression, memory loss and substance abuse. The patient does not have insomnia.    Allergic/Immunologic: Negative for HIV exposure and hives.     Objective:   Physical Exam  HENT:      Head: Normocephalic.   Eyes:      Pupils: Pupils are equal, round, and reactive to light.   Neck:      Thyroid: No  thyromegaly.      Vascular: Normal carotid pulses. No carotid bruit or JVD.   Cardiovascular:      Rate and Rhythm: Normal rate and regular rhythm. No extrasystoles are present.     Chest Wall: PMI is not displaced.      Pulses: Normal pulses.      Heart sounds: Normal heart sounds. No murmur heard.    No gallop. No S3 sounds.   Pulmonary:      Effort: No respiratory distress.      Breath sounds: Normal breath sounds. No stridor.   Abdominal:      General: Bowel sounds are normal.      Palpations: Abdomen is soft.      Tenderness: There is no abdominal tenderness. There is no rebound.   Musculoskeletal:         General: Normal range of motion.   Skin:     Findings: No rash.   Neurological:      Mental Status: She is alert and oriented to person, place, and time.   Psychiatric:         Behavior: Behavior normal.       Lab Results   Component Value Date    CHOL 143 05/11/2023    CHOL 208 (H) 10/27/2022     Lab Results   Component Value Date    HDL 39 (L) 05/11/2023    HDL 49 10/27/2022     Lab Results   Component Value Date    LDLCALC 92.2 05/11/2023    LDLCALC 144.4 10/27/2022     Lab Results   Component Value Date    TRIG 59 05/11/2023    TRIG 73 10/27/2022     Lab Results   Component Value Date    CHOLHDL 27.3 05/11/2023    CHOLHDL 23.6 10/27/2022       Chemistry        Component Value Date/Time     05/11/2023 0837    K 4.3 05/11/2023 0837     05/11/2023 0837    CO2 22 (L) 05/11/2023 0837    BUN 10 05/11/2023 0837    CREATININE 0.7 05/11/2023 0837    GLU 97 05/11/2023 0837        Component Value Date/Time    CALCIUM 9.2 05/11/2023 0837    ALKPHOS 77 05/11/2023 0837    AST 18 05/11/2023 0837    ALT 18 05/11/2023 0837    BILITOT 0.7 05/11/2023 0837          Lab Results   Component Value Date    HGBA1C 5.0 05/11/2023     Lab Results   Component Value Date    TSH 1.263 10/27/2022     Lab Results   Component Value Date    INR 1.1 04/12/2023     Lab Results   Component Value Date    WBC 9.15 05/11/2023    HGB  14.2 05/11/2023    HCT 43.6 05/11/2023    MCV 90 05/11/2023     05/11/2023     BNP  @LABRCNTIP(BNP,BNPTRIAGEBLO)@  CrCl cannot be calculated (Patient's most recent lab result is older than the maximum 7 days allowed.).  No results found in the last 24 hours.  No results found in the last 24 hours.  No results found in the last 24 hours.    Assessment:      1. Other chest pain    2. Precordial pain    3. Family history of premature CAD    4. Tobacco abuse    5. Dizziness    6. Palpitation      Less likely angina chest pain. ETT pending  Palpitation and dizziness    Plan:   Echo and BARDY for chest pain palpitation and dizziness  Smoking cessation  If ETT negative f/u with PCP for noncardiac chest pain      Recommend heart-healthy diet, weight control and regular exercise.  Bryson. Risk modification.   I have reviewed all pertinent labs and cardiac studies independently. Plans and recommendations have been formulated under my direct supervision. All questions answered and patient voiced understanding.   If symptoms persist go to the ED    F/u with PCP

## 2023-06-01 ENCOUNTER — PATIENT MESSAGE (OUTPATIENT)
Dept: PSYCHIATRY | Facility: CLINIC | Age: 33
End: 2023-06-01
Payer: COMMERCIAL

## 2023-06-13 ENCOUNTER — TELEPHONE (OUTPATIENT)
Dept: CARDIOLOGY | Facility: CLINIC | Age: 33
End: 2023-06-13
Payer: COMMERCIAL

## 2023-06-13 NOTE — TELEPHONE ENCOUNTER
Spoke with pt in regards to getting Holter monitor appt rescheduled.             ----- Message from Kasandra Medel sent at 6/13/2023 12:26 PM CDT -----  Pt needs appointment rescheduled holter ,Please call back at [phone].  Or my chart Thanks

## 2023-12-21 RX ORDER — LINACLOTIDE 72 UG/1
72 CAPSULE, GELATIN COATED ORAL
Qty: 90 CAPSULE | Refills: 0 | Status: SHIPPED | OUTPATIENT
Start: 2023-12-21 | End: 2024-03-04 | Stop reason: SDUPTHER

## 2023-12-21 NOTE — TELEPHONE ENCOUNTER
No care due was identified.  [facility] Embedded Care Due Messages. Reference number: [id number].   12/21/2023 3:53:41 PM CST

## 2024-03-04 ENCOUNTER — OFFICE VISIT (OUTPATIENT)
Dept: FAMILY MEDICINE | Facility: CLINIC | Age: 34
End: 2024-03-04
Attending: FAMILY MEDICINE
Payer: COMMERCIAL

## 2024-03-04 VITALS
HEIGHT: 65 IN | OXYGEN SATURATION: 98 % | DIASTOLIC BLOOD PRESSURE: 74 MMHG | TEMPERATURE: 99 F | SYSTOLIC BLOOD PRESSURE: 124 MMHG | HEART RATE: 86 BPM | BODY MASS INDEX: 34.01 KG/M2 | WEIGHT: 204.13 LBS

## 2024-03-04 DIAGNOSIS — K21.9 GASTROESOPHAGEAL REFLUX DISEASE, UNSPECIFIED WHETHER ESOPHAGITIS PRESENT: ICD-10-CM

## 2024-03-04 DIAGNOSIS — Z82.49 FAMILY HISTORY OF EARLY CAD: Primary | ICD-10-CM

## 2024-03-04 DIAGNOSIS — E66.9 OBESITY, UNSPECIFIED CLASSIFICATION, UNSPECIFIED OBESITY TYPE, UNSPECIFIED WHETHER SERIOUS COMORBIDITY PRESENT: ICD-10-CM

## 2024-03-04 DIAGNOSIS — R19.04 LEFT LOWER QUADRANT ABDOMINAL MASS: ICD-10-CM

## 2024-03-04 PROBLEM — R42 DIZZINESS: Status: RESOLVED | Noted: 2023-05-31 | Resolved: 2024-03-04

## 2024-03-04 PROBLEM — R00.2 PALPITATION: Status: RESOLVED | Noted: 2023-05-31 | Resolved: 2024-03-04

## 2024-03-04 PROBLEM — R07.89 OTHER CHEST PAIN: Status: RESOLVED | Noted: 2023-05-31 | Resolved: 2024-03-04

## 2024-03-04 PROCEDURE — 1159F MED LIST DOCD IN RCRD: CPT | Mod: CPTII,S$GLB,, | Performed by: FAMILY MEDICINE

## 2024-03-04 PROCEDURE — 1160F RVW MEDS BY RX/DR IN RCRD: CPT | Mod: CPTII,S$GLB,, | Performed by: FAMILY MEDICINE

## 2024-03-04 PROCEDURE — 99999 PR PBB SHADOW E&M-EST. PATIENT-LVL IV: CPT | Mod: PBBFAC,,, | Performed by: FAMILY MEDICINE

## 2024-03-04 PROCEDURE — 3074F SYST BP LT 130 MM HG: CPT | Mod: CPTII,S$GLB,, | Performed by: FAMILY MEDICINE

## 2024-03-04 PROCEDURE — 3008F BODY MASS INDEX DOCD: CPT | Mod: CPTII,S$GLB,, | Performed by: FAMILY MEDICINE

## 2024-03-04 PROCEDURE — 90677 PCV20 VACCINE IM: CPT | Mod: S$GLB,,, | Performed by: FAMILY MEDICINE

## 2024-03-04 PROCEDURE — 90471 IMMUNIZATION ADMIN: CPT | Mod: S$GLB,,, | Performed by: FAMILY MEDICINE

## 2024-03-04 PROCEDURE — 3078F DIAST BP <80 MM HG: CPT | Mod: CPTII,S$GLB,, | Performed by: FAMILY MEDICINE

## 2024-03-04 PROCEDURE — 99214 OFFICE O/P EST MOD 30 MIN: CPT | Mod: 25,S$GLB,, | Performed by: FAMILY MEDICINE

## 2024-03-04 RX ORDER — PANTOPRAZOLE SODIUM 40 MG/1
40 TABLET, DELAYED RELEASE ORAL DAILY
Qty: 90 TABLET | Refills: 4 | Status: SHIPPED | OUTPATIENT
Start: 2024-03-04 | End: 2025-03-04

## 2024-03-04 NOTE — PROGRESS NOTES
"Subjective:       Patient ID: Rebecca Shetty is a 34 y.o. female.    Chief Complaint: Follow-up ("Knots in stomach")     34 y old female here for f.u . She has felt small tender masses on L flank/ LLQ for few months . Also continues to have CP .She was advised to stop the Crestor . Continues to smoke. Worried bout having cushing syndrome due to round face , cervical hump, hirsutism     and stretch  marks on abdomen       Review of Systems   Constitutional: Negative.    HENT: Negative.     Eyes: Negative.    Respiratory: Negative.     Cardiovascular: Negative.    Gastrointestinal: Negative.    Genitourinary: Negative.    Musculoskeletal: Negative.    Skin: Negative.    Hematological: Negative.        Objective:      Physical Exam  Constitutional:       General: She is not in acute distress.     Appearance: She is well-developed. She is not diaphoretic.   HENT:      Head: Normocephalic and atraumatic.      Right Ear: External ear normal.      Left Ear: External ear normal.      Mouth/Throat:      Pharynx: No oropharyngeal exudate.   Eyes:      General: No scleral icterus.        Right eye: No discharge.         Left eye: No discharge.      Conjunctiva/sclera: Conjunctivae normal.      Pupils: Pupils are equal, round, and reactive to light.   Neck:      Thyroid: No thyromegaly.      Vascular: No JVD.      Trachea: No tracheal deviation.   Cardiovascular:      Rate and Rhythm: Normal rate and regular rhythm.      Heart sounds: Normal heart sounds. No murmur heard.     No friction rub. No gallop.   Pulmonary:      Effort: Pulmonary effort is normal. No respiratory distress.      Breath sounds: Normal breath sounds. No stridor. No wheezing or rales.   Chest:      Chest wall: No tenderness.   Abdominal:      General: Bowel sounds are normal. There is no distension.      Palpations: Abdomen is soft.      Tenderness: There is no abdominal tenderness. There is no guarding or rebound.   Musculoskeletal:         General: " Normal range of motion.      Cervical back: Normal range of motion and neck supple.   Lymphadenopathy:      Cervical: No cervical adenopathy.   Skin:     General: Skin is warm and dry.   Neurological:      Mental Status: She is alert and oriented to person, place, and time.   Psychiatric:         Behavior: Behavior normal.         Thought Content: Thought content normal.         Judgment: Judgment normal.         Assessment:         Rebecca was seen today for follow-up.    Diagnoses and all orders for this visit:    Family history of early CAD  -     CBC Auto Differential; Future  -     Comprehensive Metabolic Panel; Future  -     Hemoglobin A1C; Future  -     Lipid Panel; Future    Gastroesophageal reflux disease, unspecified whether esophagitis present  -     pantoprazole (PROTONIX) 40 MG tablet; Take 1 tablet (40 mg total) by mouth once daily.    Left lower quadrant abdominal mass  -     US Soft Tissue Abdomen; Future    Other orders  -     linaCLOtide (LINZESS) 72 mcg Cap capsule; Take 1 capsule (72 mcg total) by mouth before breakfast.  -     (In Office Administered) Pneumococcal Conjugate Vaccine (20 Valent) (IM) (Preferred)       Plan:     Rebecca was seen today for follow-up.    Diagnoses and all orders for this visit:    Family history of early CAD  -     CBC Auto Differential; Future  -     Comprehensive Metabolic Panel; Future  -     Hemoglobin A1C; Future  -     Lipid Panel; Future    Gastroesophageal reflux disease, unspecified whether esophagitis present  -     pantoprazole (PROTONIX) 40 MG tablet; Take 1 tablet (40 mg total) by mouth once daily.    Other orders  -     linaCLOtide (LINZESS) 72 mcg Cap capsule; Take 1 capsule (72 mcg total) by mouth before breakfast.     Advised to resume Statin . F,u with card   PPI   U/s   Endocrinology ?

## 2024-03-14 PROBLEM — E78.00 HYPERCHOLESTEREMIA: Status: ACTIVE | Noted: 2024-03-14

## 2024-03-15 ENCOUNTER — LAB VISIT (OUTPATIENT)
Dept: LAB | Facility: HOSPITAL | Age: 34
End: 2024-03-15
Attending: FAMILY MEDICINE
Payer: COMMERCIAL

## 2024-03-15 ENCOUNTER — OFFICE VISIT (OUTPATIENT)
Dept: CARDIOLOGY | Facility: CLINIC | Age: 34
End: 2024-03-15
Payer: COMMERCIAL

## 2024-03-15 VITALS
BODY MASS INDEX: 34.27 KG/M2 | SYSTOLIC BLOOD PRESSURE: 108 MMHG | WEIGHT: 205.94 LBS | HEART RATE: 78 BPM | DIASTOLIC BLOOD PRESSURE: 80 MMHG | RESPIRATION RATE: 16 BRPM

## 2024-03-15 DIAGNOSIS — R94.31 ABNORMAL ECG: ICD-10-CM

## 2024-03-15 DIAGNOSIS — R06.09 DOE (DYSPNEA ON EXERTION): ICD-10-CM

## 2024-03-15 DIAGNOSIS — Z82.49 FAMILY HISTORY OF PREMATURE CAD: ICD-10-CM

## 2024-03-15 DIAGNOSIS — E66.09 CLASS 1 OBESITY DUE TO EXCESS CALORIES WITHOUT SERIOUS COMORBIDITY WITH BODY MASS INDEX (BMI) OF 33.0 TO 33.9 IN ADULT: ICD-10-CM

## 2024-03-15 DIAGNOSIS — E78.00 HYPERCHOLESTEREMIA: ICD-10-CM

## 2024-03-15 DIAGNOSIS — Z72.0 TOBACCO ABUSE: ICD-10-CM

## 2024-03-15 DIAGNOSIS — R00.2 PALPITATION: ICD-10-CM

## 2024-03-15 DIAGNOSIS — Z82.49 FAMILY HISTORY OF EARLY CAD: ICD-10-CM

## 2024-03-15 DIAGNOSIS — Z91.89 AT RISK FOR SLEEP APNEA: ICD-10-CM

## 2024-03-15 DIAGNOSIS — R07.89 ATYPICAL CHEST PAIN: Primary | ICD-10-CM

## 2024-03-15 LAB
ALBUMIN SERPL BCP-MCNC: 4.1 G/DL (ref 3.5–5.2)
ALP SERPL-CCNC: 74 U/L (ref 55–135)
ALT SERPL W/O P-5'-P-CCNC: 20 U/L (ref 10–44)
ANION GAP SERPL CALC-SCNC: 9 MMOL/L (ref 8–16)
AST SERPL-CCNC: 20 U/L (ref 10–40)
BASOPHILS # BLD AUTO: 0.06 K/UL (ref 0–0.2)
BASOPHILS NFR BLD: 0.7 % (ref 0–1.9)
BILIRUB SERPL-MCNC: 0.5 MG/DL (ref 0.1–1)
BUN SERPL-MCNC: 12 MG/DL (ref 6–20)
CALCIUM SERPL-MCNC: 9.8 MG/DL (ref 8.7–10.5)
CHLORIDE SERPL-SCNC: 106 MMOL/L (ref 95–110)
CHOLEST SERPL-MCNC: 237 MG/DL (ref 120–199)
CHOLEST/HDLC SERPL: 5.4 {RATIO} (ref 2–5)
CO2 SERPL-SCNC: 25 MMOL/L (ref 23–29)
CREAT SERPL-MCNC: 0.8 MG/DL (ref 0.5–1.4)
DIFFERENTIAL METHOD BLD: NORMAL
EOSINOPHIL # BLD AUTO: 0.2 K/UL (ref 0–0.5)
EOSINOPHIL NFR BLD: 2.6 % (ref 0–8)
ERYTHROCYTE [DISTWIDTH] IN BLOOD BY AUTOMATED COUNT: 12.1 % (ref 11.5–14.5)
EST. GFR  (NO RACE VARIABLE): >60 ML/MIN/1.73 M^2
ESTIMATED AVG GLUCOSE: 103 MG/DL (ref 68–131)
GLUCOSE SERPL-MCNC: 95 MG/DL (ref 70–110)
HBA1C MFR BLD: 5.2 % (ref 4–5.6)
HCT VFR BLD AUTO: 48 % (ref 37–48.5)
HDLC SERPL-MCNC: 44 MG/DL (ref 40–75)
HDLC SERPL: 18.6 % (ref 20–50)
HGB BLD-MCNC: 16 G/DL (ref 12–16)
IMM GRANULOCYTES # BLD AUTO: 0.01 K/UL (ref 0–0.04)
IMM GRANULOCYTES NFR BLD AUTO: 0.1 % (ref 0–0.5)
LDLC SERPL CALC-MCNC: 172.6 MG/DL (ref 63–159)
LYMPHOCYTES # BLD AUTO: 2.1 K/UL (ref 1–4.8)
LYMPHOCYTES NFR BLD: 26.3 % (ref 18–48)
MCH RBC QN AUTO: 30.1 PG (ref 27–31)
MCHC RBC AUTO-ENTMCNC: 33.3 G/DL (ref 32–36)
MCV RBC AUTO: 90 FL (ref 82–98)
MONOCYTES # BLD AUTO: 0.5 K/UL (ref 0.3–1)
MONOCYTES NFR BLD: 6.5 % (ref 4–15)
NEUTROPHILS # BLD AUTO: 5.2 K/UL (ref 1.8–7.7)
NEUTROPHILS NFR BLD: 63.8 % (ref 38–73)
NONHDLC SERPL-MCNC: 193 MG/DL
NRBC BLD-RTO: 0 /100 WBC
PLATELET # BLD AUTO: 345 K/UL (ref 150–450)
PMV BLD AUTO: 10.2 FL (ref 9.2–12.9)
POTASSIUM SERPL-SCNC: 4.2 MMOL/L (ref 3.5–5.1)
PROT SERPL-MCNC: 7.2 G/DL (ref 6–8.4)
RBC # BLD AUTO: 5.31 M/UL (ref 4–5.4)
SODIUM SERPL-SCNC: 140 MMOL/L (ref 136–145)
TRIGL SERPL-MCNC: 102 MG/DL (ref 30–150)
WBC # BLD AUTO: 8.13 K/UL (ref 3.9–12.7)

## 2024-03-15 PROCEDURE — 85025 COMPLETE CBC W/AUTO DIFF WBC: CPT | Performed by: FAMILY MEDICINE

## 2024-03-15 PROCEDURE — 80061 LIPID PANEL: CPT | Performed by: FAMILY MEDICINE

## 2024-03-15 PROCEDURE — 1159F MED LIST DOCD IN RCRD: CPT | Mod: CPTII,S$GLB,, | Performed by: INTERNAL MEDICINE

## 2024-03-15 PROCEDURE — 1160F RVW MEDS BY RX/DR IN RCRD: CPT | Mod: CPTII,S$GLB,, | Performed by: INTERNAL MEDICINE

## 2024-03-15 PROCEDURE — 3008F BODY MASS INDEX DOCD: CPT | Mod: CPTII,S$GLB,, | Performed by: INTERNAL MEDICINE

## 2024-03-15 PROCEDURE — 36415 COLL VENOUS BLD VENIPUNCTURE: CPT | Performed by: FAMILY MEDICINE

## 2024-03-15 PROCEDURE — 3079F DIAST BP 80-89 MM HG: CPT | Mod: CPTII,S$GLB,, | Performed by: INTERNAL MEDICINE

## 2024-03-15 PROCEDURE — 80053 COMPREHEN METABOLIC PANEL: CPT | Performed by: FAMILY MEDICINE

## 2024-03-15 PROCEDURE — 99215 OFFICE O/P EST HI 40 MIN: CPT | Mod: S$GLB,,, | Performed by: INTERNAL MEDICINE

## 2024-03-15 PROCEDURE — 3074F SYST BP LT 130 MM HG: CPT | Mod: CPTII,S$GLB,, | Performed by: INTERNAL MEDICINE

## 2024-03-15 PROCEDURE — 99999 PR PBB SHADOW E&M-EST. PATIENT-LVL III: CPT | Mod: PBBFAC,,, | Performed by: INTERNAL MEDICINE

## 2024-03-15 PROCEDURE — 83036 HEMOGLOBIN GLYCOSYLATED A1C: CPT | Performed by: FAMILY MEDICINE

## 2024-03-15 NOTE — PROGRESS NOTES
Subjective:   Patient ID:  Rebecca Shetty is a 34 y.o. female who presents for evaluation of Chest Pain        HPI  Pt presents for eval of palpitations.  Current med conditions include HTN, obese, hyperlipidemia, GERD, smoker.  .  Family h/o CAD (father PCI age 58).  Pt saw Marya Madera, May 2023 for CP, palpitations, dizziness.  Chart reviewed in detail.  Ecg May 2023 NSR, T wave abnl septal leads.  Negative stress test May 2023.  Echo May 2023 normal LV function, mild TR, PAP 30 mmHg.  Has chronic atypical CP sxs.  At least once week.  CP for years.  CP 5 minutes, intermittent.  No specific provoking factors or alleviating factors.   Fluctuates, worse recently?  Some chronic METCALF.  At risk for BYRON.  Smoker.  No exercise.  Some palpitations, chronic overall.          Past Medical History:   Diagnosis Date    Gastroesophageal reflux disease without esophagitis 3/24/2023    Hypercholesteremia     Hypertension        Current Outpatient Medications:     linaCLOtide (LINZESS) 72 mcg Cap capsule, Take 1 capsule (72 mcg total) by mouth before breakfast., Disp: 90 capsule, Rfl: 4    pantoprazole (PROTONIX) 40 MG tablet, Take 1 tablet (40 mg total) by mouth once daily., Disp: 90 tablet, Rfl: 4    Current Facility-Administered Medications:     albuterol inhaler 2 puff, 2 puff, Inhalation, Q20 Min PRN, Fabiano Lopez MD    levonorgestreL (KYLEENA) 17.5 mcg/24 hrs (5 yrs) 19.5 mg IUD 17.5 mcg, 17.5 mcg, Intrauterine, , Lucy House MD, 17.5 mcg at 05/11/22 1030        Review of Systems   Constitutional: Negative.   HENT: Negative.     Eyes: Negative.    Cardiovascular:  Positive for chest pain, dyspnea on exertion and palpitations.   Respiratory:  Positive for shortness of breath.    Endocrine: Negative.    Hematologic/Lymphatic: Negative.    Skin: Negative.    Musculoskeletal: Negative.    Gastrointestinal: Negative.    Genitourinary: Negative.    Neurological:  Positive for headaches.    Psychiatric/Behavioral: Negative.     Allergic/Immunologic: Negative.        /80   Pulse 78   Resp 16   Wt 93.4 kg (205 lb 14.6 oz)   BMI 34.27 kg/m²     Wt Readings from Last 3 Encounters:   03/15/24 93.4 kg (205 lb 14.6 oz)   03/04/24 92.6 kg (204 lb 2.3 oz)   05/31/23 93 kg (205 lb)     Temp Readings from Last 3 Encounters:   03/04/24 98.7 °F (37.1 °C) (Tympanic)   05/11/23 99.1 °F (37.3 °C) (Tympanic)   03/24/23 98 °F (36.7 °C)     BP Readings from Last 3 Encounters:   03/15/24 108/80   03/04/24 124/74   05/31/23 118/72     Pulse Readings from Last 3 Encounters:   03/15/24 78   03/04/24 86   05/31/23 74         Objective:   Physical Exam  Vitals and nursing note reviewed.   Constitutional:       General: She is not in acute distress.     Appearance: Normal appearance. She is well-developed. She is obese. She is not ill-appearing or diaphoretic.   HENT:      Head: Normocephalic.   Neck:      Thyroid: No thyromegaly.      Vascular: No carotid bruit or JVD.   Cardiovascular:      Rate and Rhythm: Normal rate and regular rhythm.      Pulses: Normal pulses.           Radial pulses are 2+ on the right side and 2+ on the left side.      Heart sounds: Normal heart sounds, S1 normal and S2 normal. No murmur heard.     No friction rub. No gallop.   Pulmonary:      Effort: Pulmonary effort is normal.      Breath sounds: Normal breath sounds. No wheezing or rales.   Abdominal:      General: Bowel sounds are normal. There is no abdominal bruit.      Palpations: Abdomen is soft.      Tenderness: There is no abdominal tenderness.   Musculoskeletal:      Cervical back: Neck supple.   Lymphadenopathy:      Cervical: No cervical adenopathy.   Skin:     General: Skin is warm.   Neurological:      Mental Status: She is alert and oriented to person, place, and time.   Psychiatric:         Behavior: Behavior normal. Behavior is cooperative.       I have reviewed all pertinent labs and cardiac studies.      Chemistry         Component Value Date/Time     05/11/2023 0837    K 4.3 05/11/2023 0837     05/11/2023 0837    CO2 22 (L) 05/11/2023 0837    BUN 10 05/11/2023 0837    CREATININE 0.7 05/11/2023 0837    GLU 97 05/11/2023 0837        Component Value Date/Time    CALCIUM 9.2 05/11/2023 0837    ALKPHOS 77 05/11/2023 0837    AST 18 05/11/2023 0837    ALT 18 05/11/2023 0837    BILITOT 0.7 05/11/2023 0837        Lab Results   Component Value Date    WBC 9.15 05/11/2023    HGB 14.2 05/11/2023    HCT 43.6 05/11/2023    MCV 90 05/11/2023     05/11/2023       Lab Results   Component Value Date    HGBA1C 5.0 05/11/2023       Lab Results   Component Value Date    CHOL 143 05/11/2023    CHOL 208 (H) 10/27/2022     Lab Results   Component Value Date    HDL 39 (L) 05/11/2023    HDL 49 10/27/2022     Lab Results   Component Value Date    LDLCALC 92.2 05/11/2023    LDLCALC 144.4 10/27/2022     Lab Results   Component Value Date    TRIG 59 05/11/2023    TRIG 73 10/27/2022       Lab Results   Component Value Date    CHOLHDL 27.3 05/11/2023    CHOLHDL 23.6 10/27/2022         Results for orders placed during the hospital encounter of 05/31/23    Echo    Interpretation Summary  · The left ventricle is normal in size with normal systolic function.  · The estimated ejection fraction is 55%.  · Normal left ventricular diastolic function.  · Normal right ventricular size with normal right ventricular systolic function.  · Mild tricuspid regurgitation.  · Normal central venous pressure (3 mmHg).  · The estimated PA systolic pressure is 30 mmHg.      Results for orders placed during the hospital encounter of 05/31/23    Exercise Stress - EKG    Interpretation Summary    The ECG portion of the study is negative for ischemia.    The patient reported chest pain during the stress test.    The blood pressure response to stress was normal.    There were no arrhythmias during stress.    The exercise capacity was normal.    The patient exercised for  8 minutes 4 seconds on a Yefri protocol, corresponding to a functional capacity of 10 METS, achieving a peak heart rate of 166 bpm, which is 94 % of the age predicted maximum heart rate. Their exercise capacity was normal.      Assessment:      1. Atypical chest pain    2. Palpitation    3. Tobacco abuse    4. Family history of premature CAD    5. Class 1 obesity due to excess calories without serious comorbidity with body mass index (BMI) of 33.0 to 33.9 in adult    6. Hypercholesteremia    7. METCALF (dyspnea on exertion)    8. At risk for sleep apnea    9. Abnormal ECG        Plan:         Chronic atypical CP, fluctuating pattern.  At risk for CAD however, still younger pt.  Stress echocardiogram.  1 week Vital Holter.  Consider LHC if stress echo + for ischemia.  Risks/benefits of LHC/PCI discussed.  Obesity: weight loss advised.  Tobacco abuse: pt counseled on need to quit.  Exercise daily x 30 minutes.  Possible BYRON: Pulmonary consultation to be scheduled.  Lipids:  10 year ASCVD risk < 4% still --- focus on diet, weight loss, exercise.   Consider statin if needed in future.      PHONE REVIEW.        I have reviewed all pertinent labs and cardiac studies independently. Plans and recommendations have been formulated under my direct supervision. All questions answered and patient voiced understanding.

## 2024-03-18 ENCOUNTER — PATIENT MESSAGE (OUTPATIENT)
Dept: FAMILY MEDICINE | Facility: CLINIC | Age: 34
End: 2024-03-18
Payer: COMMERCIAL

## 2024-03-18 ENCOUNTER — TELEPHONE (OUTPATIENT)
Dept: FAMILY MEDICINE | Facility: CLINIC | Age: 34
End: 2024-03-18
Payer: COMMERCIAL

## 2024-03-18 DIAGNOSIS — D17.9 LIPOMA, UNSPECIFIED SITE: Primary | ICD-10-CM

## 2024-03-18 NOTE — TELEPHONE ENCOUNTER
Spoke to pt and informed her that her message was sent to Dr. Elizondo to  send medication and place referral. She verbalized understanding.

## 2024-03-18 NOTE — TELEPHONE ENCOUNTER
----- Message from Arti Cohen sent at 3/18/2024  1:18 PM CDT -----  Patient is requesting a call back regarding the message that was left. The patient doesn't have anymore Crestor and yes she would like to an order for a surgeon regarding her stomach. Call back number is [phone]. Thx.EL

## 2024-03-19 RX ORDER — ROSUVASTATIN CALCIUM 10 MG/1
10 TABLET, COATED ORAL DAILY
Qty: 90 TABLET | Refills: 3 | Status: SHIPPED | OUTPATIENT
Start: 2024-03-19 | End: 2024-04-15

## 2024-03-25 ENCOUNTER — HOSPITAL ENCOUNTER (OUTPATIENT)
Dept: RADIOLOGY | Facility: HOSPITAL | Age: 34
Discharge: HOME | End: 2024-03-25
Attending: NURSE PRACTITIONER
Payer: COMMERCIAL

## 2024-03-25 ENCOUNTER — OFFICE VISIT (OUTPATIENT)
Dept: PULMONOLOGY | Facility: CLINIC | Age: 34
End: 2024-03-25
Payer: COMMERCIAL

## 2024-03-25 VITALS
HEART RATE: 84 BPM | RESPIRATION RATE: 18 BRPM | WEIGHT: 207.44 LBS | SYSTOLIC BLOOD PRESSURE: 120 MMHG | DIASTOLIC BLOOD PRESSURE: 70 MMHG | BODY MASS INDEX: 34.56 KG/M2 | HEIGHT: 65 IN | OXYGEN SATURATION: 99 %

## 2024-03-25 DIAGNOSIS — R06.09 DOE (DYSPNEA ON EXERTION): ICD-10-CM

## 2024-03-25 DIAGNOSIS — R00.2 PALPITATION: Primary | ICD-10-CM

## 2024-03-25 DIAGNOSIS — Z91.89 AT RISK FOR SLEEP APNEA: ICD-10-CM

## 2024-03-25 DIAGNOSIS — G47.30 SLEEP DISORDER BREATHING: ICD-10-CM

## 2024-03-25 DIAGNOSIS — Z72.0 TOBACCO ABUSE: ICD-10-CM

## 2024-03-25 PROCEDURE — 3078F DIAST BP <80 MM HG: CPT | Mod: CPTII,S$GLB,, | Performed by: NURSE PRACTITIONER

## 2024-03-25 PROCEDURE — 71046 X-RAY EXAM CHEST 2 VIEWS: CPT | Mod: 26,,, | Performed by: RADIOLOGY

## 2024-03-25 PROCEDURE — 1159F MED LIST DOCD IN RCRD: CPT | Mod: CPTII,S$GLB,, | Performed by: NURSE PRACTITIONER

## 2024-03-25 PROCEDURE — 1160F RVW MEDS BY RX/DR IN RCRD: CPT | Mod: CPTII,S$GLB,, | Performed by: NURSE PRACTITIONER

## 2024-03-25 PROCEDURE — 99204 OFFICE O/P NEW MOD 45 MIN: CPT | Mod: S$GLB,,, | Performed by: NURSE PRACTITIONER

## 2024-03-25 PROCEDURE — 3044F HG A1C LEVEL LT 7.0%: CPT | Mod: CPTII,S$GLB,, | Performed by: NURSE PRACTITIONER

## 2024-03-25 PROCEDURE — 99999 PR PBB SHADOW E&M-EST. PATIENT-LVL V: CPT | Mod: PBBFAC,,, | Performed by: NURSE PRACTITIONER

## 2024-03-25 PROCEDURE — 3008F BODY MASS INDEX DOCD: CPT | Mod: CPTII,S$GLB,, | Performed by: NURSE PRACTITIONER

## 2024-03-25 PROCEDURE — 3074F SYST BP LT 130 MM HG: CPT | Mod: CPTII,S$GLB,, | Performed by: NURSE PRACTITIONER

## 2024-03-25 PROCEDURE — 71046 X-RAY EXAM CHEST 2 VIEWS: CPT | Mod: TC

## 2024-03-25 RX ORDER — VARENICLINE TARTRATE 0.5 (11)-1
KIT ORAL
Qty: 1 EACH | Refills: 0 | Status: SHIPPED | OUTPATIENT
Start: 2024-03-25

## 2024-03-25 RX ORDER — VARENICLINE TARTRATE 1 MG/1
1 TABLET, FILM COATED ORAL 2 TIMES DAILY
Qty: 60 TABLET | Refills: 4 | Status: SHIPPED | OUTPATIENT
Start: 2024-03-25

## 2024-03-25 NOTE — PROGRESS NOTES
Subjective:      Patient ID: Rebecca Shetty is a 34 y.o. female.    Chief Complaint: Sleep Apnea    HPI    Patient presents today for evaluation of sleep apnea.  Patient with snoring and witnessed apneas. She was dx with BYRON a few years ago but did not follow up with treatment. Patient not having problems falling asleep, but wakes up frequently throughout the night.  Patient does not wake up feeling refreshed in the morning.  Patient with daytime hypersomnolence.   Comorbidities include palpitations. Atypical CP,METCALF. She is interested in smoking cessation. Wheezing at night.    STOP - BANG Questionnaire:     1. Snoring : Do you snore loudly ?    Yes    2. Tired : Do you often feel tired, fatigued, or sleepy during daytime?   Yes    3. Observed: Has anyone observed you stop breathing during your sleep?   Yes    4. Blood pressure : Do you have or are you being treated for high blood pressure?   No    5. BMI :BMI more than 35 kg/m2?   No    6. Age : Age over 50 yr old?   No    7. Neck circumference: Neck circumference greater than 40 cm?   Yes    8. Gender: Gender male?   No    High risk of BYRON: Yes 5 - 8  Intermediate risk of BYRON: Yes 3 - 4  Low risk of BYRON: Yes 0 - 2      References:   STOP Questionnaire   A Tool to Screen Patients for Obstructive Sleep Apnea: ANTHONY ReynosoR.C.P.C., Quinton Pace M.B.B.S., Fazal Wright M.D.,Ratna Tobias, Ph.D., Jan Schrader M.B.B.S.,_ Marleen Bauman.,_ Aime Moncada M.D., ELDA Garland.R.C.P.C.; Anesthesiology 2008; 108:812-21 Copyright © 2008, the American Society of Anesthesiologists, Inc. [employer]       Hay Questionnaire (validated BYRON screening questionnaire)    Yes -- Snoring/apnea    Yes -- Fatigue    Body mass index is Body mass index is 34.52 kg/m²..  (>25 is overweight, >30 is obese)    Blood Pressure = normal blood pressure  (PreHTN 120-139/80-89, Stg1 140-159/90-99, Stg2 >160/>100)  Hay = two of three  "BYRON categories are positive (high risk is 2-3 positive categories)         3/25/2024    11:44 AM   EPWORTH SLEEPINESS SCALE   Sitting and reading 2   Watching TV 2   Sitting, inactive in a public place (e.g. a theatre or a meeting) 0   As a passenger in a car for an hour without a break 0   Lying down to rest in the afternoon when circumstances permit 1   Sitting and talking to someone 0   Sitting quietly after a lunch without alcohol 1   In a car, while stopped for a few minutes in traffic 0   Total score 6      (validated sleepiness questionnaire with a higher score indicating greater sleepiness; range 0-24)    Patient Active Problem List   Diagnosis    Tobacco abuse    Family history of premature CAD    Class 1 obesity due to excess calories without serious comorbidity with body mass index (BMI) of 33.0 to 33.9 in adult    Gastroesophageal reflux disease without esophagitis    Irritable bowel syndrome with constipation    Atypical chest pain    Hypercholesteremia    Abnormal ECG         /70   Pulse 84   Resp 18   Ht 5' 5" (1.651 m)   Wt 94.1 kg (207 lb 7.3 oz)   SpO2 99%   BMI 34.52 kg/m²   Body mass index is 34.52 kg/m².    Review of Systems   Constitutional:  Positive for fatigue.   Respiratory:  Positive for snoring, cough, wheezing, dyspnea on extertion and somnolence.    Cardiovascular:  Positive for chest pain and palpitations.   Psychiatric/Behavioral:  Positive for sleep disturbance.    All other systems reviewed and are negative.        Objective:      Physical Exam  Constitutional:       Appearance: She is well-developed. She is obese.   HENT:      Head: Normocephalic and atraumatic.      Mouth/Throat:      Comments: Mallampati Score: II  Neck:      Comments:    Cardiovascular:      Rate and Rhythm: Normal rate and regular rhythm.   Pulmonary:      Effort: Pulmonary effort is normal.      Breath sounds: Normal breath sounds.   Abdominal:      Palpations: Abdomen is soft.   Musculoskeletal:   "       General: Normal range of motion.      Cervical back: Normal range of motion and neck supple.   Skin:     General: Skin is warm and dry.   Neurological:      Mental Status: She is alert and oriented to person, place, and time.   Psychiatric:         Mood and Affect: Mood normal.         Behavior: Behavior normal.       Personal Diagnostic Review      Assessment:     1. Palpitation    2. At risk for sleep apnea    3. Tobacco abuse    4. Sleep disorder breathing    5. METCALF (dyspnea on exertion)       Outpatient Encounter Medications as of 3/25/2024   Medication Sig Dispense Refill    linaCLOtide (LINZESS) 72 mcg Cap capsule Take 1 capsule (72 mcg total) by mouth before breakfast. 90 capsule 4    pantoprazole (PROTONIX) 40 MG tablet Take 1 tablet (40 mg total) by mouth once daily. 90 tablet 4    rosuvastatin (CRESTOR) 10 MG tablet Take 1 tablet (10 mg total) by mouth once daily. 90 tablet 3    varenicline (CHANTIX STARTING MONTH BOX) 0.5 mg (11)- 1 mg (42) tablet Take one 0.5mg tab by mouth once daily X3 days,then increase to one 0.5mg tab twice daily X4 days,then increase to one 1mg tab twice daily 1 each 0    varenicline (CHANTIX) 1 mg Tab Take 1 tablet (1 mg total) by mouth 2 (two) times daily. 60 tablet 4     Facility-Administered Encounter Medications as of 3/25/2024   Medication Dose Route Frequency Provider Last Rate Last Admin    albuterol inhaler 2 puff  2 puff Inhalation Q20 Min PRN Fabiano Lopez MD        levonorgestreL (KYLEENA) 17.5 mcg/24 hrs (5 yrs) 19.5 mg IUD 17.5 mcg  17.5 mcg Intrauterine  Lucy House MD   17.5 mcg at 05/11/22 1030     Orders Placed This Encounter   Procedures    X-Ray Chest PA And Lateral     Standing Status:   Future     Number of Occurrences:   1     Standing Expiration Date:   3/25/2025     Order Specific Question:   May the Radiologist modify the order per protocol to meet the clinical needs of the patient?     Answer:   Yes     Order Specific Question:    Release to patient     Answer:   Immediate    Ambulatory referral/consult to Smoking Cessation Program     Standing Status:   Future     Standing Expiration Date:   4/25/2025     Referral Priority:   Routine     Referral Type:   Consultation     Referral Reason:   Specialty Services Required     Requested Specialty:   CTTS     Number of Visits Requested:   1    Complete PFT with bronchodilator     Standing Status:   Future     Standing Expiration Date:   3/25/2025     Order Specific Question:   Release to patient     Answer:   Immediate    Polysomnogram (CPAP will be added if patient meets diagnostic criteria.)     Standing Status:   Future     Standing Expiration Date:   3/25/2025     Plan:     1. Palpitation    2. At risk for sleep apnea  -     Ambulatory referral/consult to Pulmonology    3. Tobacco abuse  -     Ambulatory referral/consult to Smoking Cessation Program; Future; Expected date: 04/01/2024  -     varenicline (CHANTIX STARTING MONTH BOX) 0.5 mg (11)- 1 mg (42) tablet; Take one 0.5mg tab by mouth once daily X3 days,then increase to one 0.5mg tab twice daily X4 days,then increase to one 1mg tab twice daily  Dispense: 1 each; Refill: 0  -     varenicline (CHANTIX) 1 mg Tab; Take 1 tablet (1 mg total) by mouth 2 (two) times daily.  Dispense: 60 tablet; Refill: 4    4. Sleep disorder breathing  -     Polysomnogram (CPAP will be added if patient meets diagnostic criteria.); Future    5. METCALF (dyspnea on exertion)  -     Complete PFT with bronchodilator; Future  -     X-Ray Chest PA And Lateral; Future; Expected date: 03/25/2024

## 2024-03-28 ENCOUNTER — HOSPITAL ENCOUNTER (OUTPATIENT)
Dept: CARDIOLOGY | Facility: HOSPITAL | Age: 34
Discharge: HOME | End: 2024-03-28
Attending: INTERNAL MEDICINE
Payer: COMMERCIAL

## 2024-03-28 VITALS
SYSTOLIC BLOOD PRESSURE: 95 MMHG | BODY MASS INDEX: 34.49 KG/M2 | WEIGHT: 207 LBS | HEIGHT: 65 IN | HEART RATE: 75 BPM | DIASTOLIC BLOOD PRESSURE: 60 MMHG

## 2024-03-28 DIAGNOSIS — Z72.0 TOBACCO ABUSE: ICD-10-CM

## 2024-03-28 DIAGNOSIS — R00.2 PALPITATION: ICD-10-CM

## 2024-03-28 DIAGNOSIS — R07.89 ATYPICAL CHEST PAIN: ICD-10-CM

## 2024-03-28 DIAGNOSIS — Z82.49 FAMILY HISTORY OF PREMATURE CAD: ICD-10-CM

## 2024-03-28 DIAGNOSIS — E66.09 CLASS 1 OBESITY DUE TO EXCESS CALORIES WITHOUT SERIOUS COMORBIDITY WITH BODY MASS INDEX (BMI) OF 33.0 TO 33.9 IN ADULT: ICD-10-CM

## 2024-03-28 DIAGNOSIS — R06.09 DOE (DYSPNEA ON EXERTION): ICD-10-CM

## 2024-03-28 LAB
AORTIC ROOT ANNULUS: 2.89 CM
AV INDEX (PROSTH): 0.79
AV MEAN GRADIENT: 3 MMHG
AV PEAK GRADIENT: 5 MMHG
AV VALVE AREA BY VELOCITY RATIO: 2.56 CM²
AV VALVE AREA: 2.51 CM²
AV VELOCITY RATIO: 0.81
BSA FOR ECHO PROCEDURE: 2.07 M2
CV ECHO LV RWT: 0.41 CM
CV STRESS BASE HR: 75 BPM
DIASTOLIC BLOOD PRESSURE: 60 MMHG
DOP CALC AO PEAK VEL: 1.15 M/S
DOP CALC AO VTI: 23.1 CM
DOP CALC LVOT AREA: 3.2 CM2
DOP CALC LVOT DIAMETER: 2.01 CM
DOP CALC LVOT PEAK VEL: 0.93 M/S
DOP CALC LVOT STROKE VOLUME: 58.04 CM3
DOP CALC RVOT PEAK VEL: 0.75 M/S
DOP CALC RVOT VTI: 17.9 CM
DOP CALCLVOT PEAK VEL VTI: 18.3 CM
E WAVE DECELERATION TIME: 158.07 MSEC
E/A RATIO: 1.32
E/E' RATIO: 5.8 M/S
ECHO LV POSTERIOR WALL: 0.89 CM (ref 0.6–1.1)
EJECTION FRACTION: 55 %
FRACTIONAL SHORTENING: 26 % (ref 28–44)
INTERVENTRICULAR SEPTUM: 0.72 CM (ref 0.6–1.1)
IVRT: 88.49 MSEC
LA MAJOR: 4.02 CM
LA MINOR: 4.26 CM
LA WIDTH: 3.4 CM
LEFT ATRIUM SIZE: 3.68 CM
LEFT ATRIUM VOLUME INDEX MOD: 15.5 ML/M2
LEFT ATRIUM VOLUME INDEX: 21.9 ML/M2
LEFT ATRIUM VOLUME MOD: 31.2 CM3
LEFT ATRIUM VOLUME: 43.99 CM3
LEFT INTERNAL DIMENSION IN SYSTOLE: 3.2 CM (ref 2.1–4)
LEFT VENTRICLE DIASTOLIC VOLUME INDEX: 42.44 ML/M2
LEFT VENTRICLE DIASTOLIC VOLUME: 85.31 ML
LEFT VENTRICLE MASS INDEX: 54 G/M2
LEFT VENTRICLE SYSTOLIC VOLUME INDEX: 20.4 ML/M2
LEFT VENTRICLE SYSTOLIC VOLUME: 41.07 ML
LEFT VENTRICULAR INTERNAL DIMENSION IN DIASTOLE: 4.35 CM (ref 3.5–6)
LEFT VENTRICULAR MASS: 108.26 G
LV LATERAL E/E' RATIO: 4.83 M/S
LV SEPTAL E/E' RATIO: 7.25 M/S
LVOT MG: 1.81 MMHG
LVOT MV: 0.61 CM/S
MV PEAK A VEL: 0.66 M/S
MV PEAK E VEL: 0.87 M/S
MV STENOSIS PRESSURE HALF TIME: 45.84 MS
MV VALVE AREA P 1/2 METHOD: 4.8 CM2
OHS CV CPX 1 MINUTE RECOVERY HEART RATE: 130 BPM
OHS CV CPX 85 PERCENT MAX PREDICTED HEART RATE MALE: 157
OHS CV CPX ESTIMATED METS: 10
OHS CV CPX MAX PREDICTED HEART RATE: 185
OHS CV CPX PATIENT IS FEMALE: 1
OHS CV CPX PATIENT IS MALE: 0
OHS CV CPX PEAK DIASTOLIC BLOOD PRESSURE: 103 MMHG
OHS CV CPX PEAK HEAR RATE: 181 BPM
OHS CV CPX PEAK RATE PRESSURE PRODUCT: ABNORMAL
OHS CV CPX PEAK SYSTOLIC BLOOD PRESSURE: 177 MMHG
OHS CV CPX PERCENT MAX PREDICTED HEART RATE ACHIEVED: 103
OHS CV CPX RATE PRESSURE PRODUCT PRESENTING: 7125
PISA TR MAX VEL: 1.64 M/S
PV MEAN GRADIENT: 1 MMHG
PV PEAK GRADIENT: 4 MMHG
PV PEAK VELOCITY: 0.94 M/S
RA MAJOR: 4.05 CM
RA PRESSURE ESTIMATED: 3 MMHG
RA WIDTH: 2.81 CM
RIGHT VENTRICULAR END-DIASTOLIC DIMENSION: 3.03 CM
RV TB RVSP: 5 MMHG
SINUS: 2.88 CM
STJ: 2.72 CM
STRESS ECHO POST EXERCISE DUR MIN: 8 MINUTES
STRESS ECHO POST EXERCISE DUR SEC: 7 SECONDS
SYSTOLIC BLOOD PRESSURE: 95 MMHG
TDI LATERAL: 0.18 M/S
TDI SEPTAL: 0.12 M/S
TDI: 0.15 M/S
TR MAX PG: 11 MMHG
TRICUSPID ANNULAR PLANE SYSTOLIC EXCURSION: 1.87 CM
TV REST PULMONARY ARTERY PRESSURE: 14 MMHG
Z-SCORE OF LEFT VENTRICULAR DIMENSION IN END DIASTOLE: -3.03
Z-SCORE OF LEFT VENTRICULAR DIMENSION IN END SYSTOLE: -0.96

## 2024-03-28 PROCEDURE — 93351 STRESS TTE COMPLETE: CPT | Mod: 26,,, | Performed by: INTERNAL MEDICINE

## 2024-03-28 PROCEDURE — 93320 DOPPLER ECHO COMPLETE: CPT | Mod: 26,,, | Performed by: INTERNAL MEDICINE

## 2024-03-28 PROCEDURE — 93351 STRESS TTE COMPLETE: CPT

## 2024-03-28 PROCEDURE — 93244 EXT ECG>48HR<7D REV&INTERPJ: CPT | Mod: ,,, | Performed by: INTERNAL MEDICINE

## 2024-03-28 PROCEDURE — 93325 DOPPLER ECHO COLOR FLOW MAPG: CPT | Mod: 26,,, | Performed by: INTERNAL MEDICINE

## 2024-04-08 ENCOUNTER — OFFICE VISIT (OUTPATIENT)
Dept: SURGERY | Facility: CLINIC | Age: 34
End: 2024-04-08
Attending: FAMILY MEDICINE
Payer: COMMERCIAL

## 2024-04-08 VITALS
SYSTOLIC BLOOD PRESSURE: 96 MMHG | DIASTOLIC BLOOD PRESSURE: 65 MMHG | BODY MASS INDEX: 33.79 KG/M2 | WEIGHT: 202.81 LBS | HEIGHT: 65 IN | HEART RATE: 69 BPM

## 2024-04-08 DIAGNOSIS — D17.9 LIPOMA, UNSPECIFIED SITE: ICD-10-CM

## 2024-04-08 PROCEDURE — 1159F MED LIST DOCD IN RCRD: CPT | Mod: CPTII,S$GLB,, | Performed by: SURGERY

## 2024-04-08 PROCEDURE — 3044F HG A1C LEVEL LT 7.0%: CPT | Mod: CPTII,S$GLB,, | Performed by: SURGERY

## 2024-04-08 PROCEDURE — 3078F DIAST BP <80 MM HG: CPT | Mod: CPTII,S$GLB,, | Performed by: SURGERY

## 2024-04-08 PROCEDURE — 99203 OFFICE O/P NEW LOW 30 MIN: CPT | Mod: S$GLB,,, | Performed by: SURGERY

## 2024-04-08 PROCEDURE — 3074F SYST BP LT 130 MM HG: CPT | Mod: CPTII,S$GLB,, | Performed by: SURGERY

## 2024-04-08 PROCEDURE — 99999 PR PBB SHADOW E&M-EST. PATIENT-LVL III: CPT | Mod: PBBFAC,,, | Performed by: SURGERY

## 2024-04-08 PROCEDURE — 3008F BODY MASS INDEX DOCD: CPT | Mod: CPTII,S$GLB,, | Performed by: SURGERY

## 2024-04-08 NOTE — PATIENT INSTRUCTIONS
Lumps that you feel or called lipomas.  They are benign fatty tumors.  I have included some information about this.      The lipomas will either stay the same size or slowly get bigger.      We typically remove them if people do not like that they are there or if they cause discomfort.      After talking things over with your wife if you would like to remove the lipomas please let us know.  We would need to get some labs but you do not have to be fasting and we could likely do these at the clinic closest to your house.      I do surgeries on Tuesdays Wednesdays and Fridays.      You can call the office or message us    Our office phone numbers are  [phone] and

## 2024-04-08 NOTE — PROGRESS NOTES
"Patient ID: Rebecca Shetty is a 34 y.o. female.    Chief Complaint:  Multiple lipomas    HPI:  This is a 34-year-old female with multiple nodules of her abdominal wall.  She noticed these several months ago.  The occasional discomfort is felt.  She does not report any significant pain.  She had an ultrasound done that identified 3 lipomas.  She states that there are several more present and these are best noted when she is standing up        Review of Systems   Skin:         Nodules of the abdominal wall       Current Outpatient Medications   Medication Sig Dispense Refill    linaCLOtide (LINZESS) 72 mcg Cap capsule Take 1 capsule (72 mcg total) by mouth before breakfast. 90 capsule 4    pantoprazole (PROTONIX) 40 MG tablet Take 1 tablet (40 mg total) by mouth once daily. 90 tablet 4    rosuvastatin (CRESTOR) 10 MG tablet Take 1 tablet (10 mg total) by mouth once daily. 90 tablet 3    varenicline (CHANTIX STARTING MONTH BOX) 0.5 mg (11)- 1 mg (42) tablet Take one 0.5mg tab by mouth once daily X3 days,then increase to one 0.5mg tab twice daily X4 days,then increase to one 1mg tab twice daily 1 each 0    varenicline (CHANTIX) 1 mg Tab Take 1 tablet (1 mg total) by mouth 2 (two) times daily. 60 tablet 4     Current Facility-Administered Medications   Medication Dose Route Frequency Provider Last Rate Last Admin    albuterol inhaler 2 puff  2 puff Inhalation Q20 Min PRN Fabiano Lopez MD        levonorgestreL (KYLEENA) 17.5 mcg/24 hrs (5 yrs) 19.5 mg IUD 17.5 mcg  17.5 mcg Intrauterine  Lucy House MD   17.5 mcg at 05/11/22 1030       Review of patient's allergies indicates:   Allergen Reactions    Ceclor [cefaclor] Other (See Comments)     "Really out of it"    Ciprofloxacin Other (See Comments)     Pt reports that it makes her feel disconnected       Past Medical History:   Diagnosis Date    Gastroesophageal reflux disease without esophagitis 3/24/2023    Hypercholesteremia     Hypertension  "       Past Surgical History:   Procedure Laterality Date    COLONOSCOPY N/A 11/30/2022    Procedure: COLONOSCOPY;  Surgeon: Dannie Subramanian MD;  Location: [facility];  Service: General;  Laterality: N/A;    ESOPHAGOGASTRODUODENOSCOPY N/A 3/24/2023    Procedure: EGD (ESOPHAGOGASTRODUODENOSCOPY);  Surgeon: Mina Rebolledo MD;  Location: [facility];  Service: Endoscopy;  Laterality: N/A;    INTRAUTERINE DEVICE INSERTION  05/11/2022    Kyleena    TONSILLECTOMY      TYMPANOSTOMY TUBE PLACEMENT         Social History     Socioeconomic History    Marital status:    Occupational History    Occupation: [employer]    Tobacco Use    Smoking status: Every Day     Current packs/day: 1.00     Types: Cigarettes    Smokeless tobacco: Never   Substance and Sexual Activity    Alcohol use: Yes     Comment: rarely    Drug use: No    Sexual activity: Yes     Partners: Female       Vitals:    04/08/24 1006   BP: 96/65   Pulse: 69       Physical Exam  Vitals reviewed.   Constitutional:       Comments: Overweight   Cardiovascular:      Rate and Rhythm: Normal rate and regular rhythm.   Pulmonary:      Effort: Pulmonary effort is normal.      Breath sounds: Normal breath sounds.   Abdominal:      Palpations: Abdomen is soft.      Comments: Slightly obese   Skin:     Comments: On the left flank there has a 1 cm soft mobile mass.  On the left lower quadrant there is a 1 cm soft mobile mass.  On the left mid abdomen adjacent to the umbilicus has a 0.5 cm soft mobile mass   Neurological:      Mental Status: She is alert.       Reading Physician Reading Date Result Priority   Sathish Ponce MD  [phone] 3/15/2024 Routine     Narrative & Impression  EXAMINATION:  US SOFT TISSUE, ABDOMEN     CLINICAL HISTORY:  Left lower quadrant abdominal swelling, mass and lump     TECHNIQUE:  Sonographic evaluation was performed in the left lower abdomen in the area palpable concern.  Grayscale and color Doppler was  "performed.     COMPARISON:  None     FINDINGS:  There are 3 similar well-circumscribed lesions isoechoic to surrounding fat, the largest measures 2.3 x 1.4 x 1.6 cm.     Impression:     Probable benign lipomas in the area palpable concern, left lower abdomen.  If clinical findings are suspicious surgical excision, biopsy, or MRI could be considered.        Electronically signed by: Sathish Byrd  Date:                                            03/15/2024  Time:                                           13:00    Exercise stress echo w/ color flow doppler    Height: 5' 5" (1.651 m)   Weight: 93.9 kg (207 lb)   Blood Pressure: 95/60    Date of Study: 3/28/24   Ordering Provider: Sai Santos MD   Clinical Indications: Palpitation [R00.2 (ICD-10-CM)], Tobacco abuse [Z72.0 (ICD-10-CM)], Family history of premature CAD [Z82.49 (ICD-10-CM)], Class 1 obesity due to excess calories without serious comorbidity with body mass index (BMI) of 33.0 to 33.9 in adult [E66.09, Z68.33 (ICD-10-CM)], Atypical chest pain [R07.89 (ICD-10-CM)], METCALF (dyspnea on exertion) [R06.09 (ICD-10-CM)]       Interpreting Physicians  Performing Staff   Enio Richardson MD Tech: Pita Crews, ISA, RVT   Support Staff: Travis De Luna RN        Reason for Exam  Priority: Routine  Dx: Palpitation [R00.2 (ICD-10-CM)]; Tobacco abuse [Z72.0 (ICD-10-CM)]; Family history of premature CAD [Z82.49 (ICD-10-CM)]; Class 1 obesity due to excess calories without serious comorbidity with body mass index (BMI) of 33.0 to 33.9 in adult [E66.09, Z68.33 (ICD-10-CM)]; Atypical chest pain [R07.89 (ICD-10-CM)]; METCALF (dyspnea on exertion) [R06.09 (ICD-10-CM)]     View Images Vital Vitrea     Show images for Stress Echo Which stress agent will be used? Treadmill Exercise; Color Flow Doppler? Yes  Summary         Left Ventricle: There is low normal systolic function with a visually estimated ejection fraction of 50 - 55%. Ejection fraction by visual approximation " is 55%. There is normal diastolic function.    Right Ventricle: Normal right ventricular cavity size. Wall thickness is normal. Right ventricle wall motion  is normal. Systolic function is normal.    IVC/SVC: Normal venous pressure at 3 mmHg.    Stress Protocol: The patient exercised for 8 minutes 7 seconds on a Yefri protocol, corresponding to a functional capacity of 10 METS, achieving a peak heart rate of 181 bpm, which is 103 % of the age predicted maximum heart rate. The patient experienced no angina during the test. Their exercise capacity was normal. The patient reported no symptoms during the stress test. The test was stopped because the patient requested it and they experienced leg fatigue. Target heart rate achieved.    Baseline ECG: The Baseline ECG reveals sinus rhythm. The axis is normal. The ST segments are normal.    Stress ECG: There are no ST segment deviation identified during the protocol. There are no arrhythmias during stress. There is normal blood pressure response with stress.    ECG Conclusion: The ECG portion of the study is negative for ischemia.    Post-stress Impression: The study is negative with no echocardiographic evidence of stress induced ischemia.     Study Details A complete echo was performed using complete 2D, color flow Doppler and spectral Doppler. During the study, the apical, parasternal, subcostal and suprasternal views were captured.     Echocardiography Findings    Left Ventricle The left ventricle is normal in size. Normal wall thickness. Normal wall motion. There is low normal systolic function with a visually estimated ejection fraction of 50 - 55%. Ejection fraction by visual approximation is 55%. There is normal diastolic function.   Right Ventricle Normal right ventricular cavity size. Wall thickness is normal. Right ventricle wall motion  is normal. Systolic function is normal.   Left Atrium Normal left atrial size.   Right Atrium Normal right atrial size.   Aortic  Valve The aortic valve is structurally normal. There is normal leaflet mobility. Aortic valve peak velocity is 1.15 m/s. Mean gradient is 3 mmHg. There is no significant regurgitation.   Mitral Valve The mitral valve is structurally normal. There is normal leaflet mobility. There is trace regurgitation.   Tricuspid Valve The tricuspid valve is structurally normal. There is normal leaflet mobility. There is trace regurgitation.  No pulmonary hypertension.   Pulmonic Valve The pulmonic valve is structurally normal. There is normal leaflet mobility. There is no significant regurgitation.   IVC/SVC Normal venous pressure at 3 mmHg.   Ascending Aorta Aortic root is normal in size measuring 2.88 cm. Ascending aorta is normal.   Pericardium and Other Findings There is no pericardial effusion.   Pulmonary Artery The estimated pulmonary artery systolic pressure is 14 mmHg.     Assessment & Plan:      Multiple abdominal wall lipomas.  3-0 been identified on ultrasound.  These are minimally symptomatic.      I discussed observation versus excision.  I discussed the risks benefits and complications of surgery including infection bleeding.      I discussed that all lipomas could be removed at 1 setting in the operating room.      The patient will discuss this with her wife and let us know if she desires to proceed with surgery.      Contact information was provided.  Information about lipomas were provided.     Stress echocardiogram:  No evidence of ischemia      Should she desire surgery she would need a CBC and CMP

## 2024-04-10 LAB
OHS CV HOLTER SINUS AVERAGE HR: 87
OHS CV HOLTER SINUS MAX HR: 155
OHS CV HOLTER SINUS MIN HR: 54

## 2024-04-14 ENCOUNTER — PATIENT MESSAGE (OUTPATIENT)
Dept: FAMILY MEDICINE | Facility: CLINIC | Age: 34
End: 2024-04-14
Payer: COMMERCIAL

## 2024-04-15 RX ORDER — ATORVASTATIN CALCIUM 20 MG/1
20 TABLET, FILM COATED ORAL NIGHTLY
Qty: 90 TABLET | Refills: 3 | Status: SHIPPED | OUTPATIENT
Start: 2024-04-15 | End: 2025-04-15

## 2024-05-02 ENCOUNTER — TELEPHONE (OUTPATIENT)
Dept: SMOKING CESSATION | Facility: CLINIC | Age: 34
End: 2024-05-02
Payer: COMMERCIAL

## 2024-05-02 NOTE — TELEPHONE ENCOUNTER
Attempted to call patient to reschedule their smoking cessation appointment. Left a message with my contact information to reschedule the appointment. Ameena Melgoza [phone].

## 2024-05-06 ENCOUNTER — TELEPHONE (OUTPATIENT)
Dept: SMOKING CESSATION | Facility: CLINIC | Age: 34
End: 2024-05-06
Payer: COMMERCIAL

## 2024-05-06 NOTE — TELEPHONE ENCOUNTER
Attempted to call patient to reschedule their smoking cessation appointment. Left a message with my contact information to reschedule the appointment. Ameena Melgoza [phone].

## 2024-06-13 ENCOUNTER — HOSPITAL ENCOUNTER (OUTPATIENT)
Dept: RADIOLOGY | Facility: HOSPITAL | Age: 34
Discharge: HOME | End: 2024-06-13
Attending: FAMILY MEDICINE
Payer: COMMERCIAL

## 2024-06-13 ENCOUNTER — OFFICE VISIT (OUTPATIENT)
Dept: FAMILY MEDICINE | Facility: CLINIC | Age: 34
End: 2024-06-13
Attending: FAMILY MEDICINE
Payer: COMMERCIAL

## 2024-06-13 VITALS
DIASTOLIC BLOOD PRESSURE: 70 MMHG | WEIGHT: 207.44 LBS | HEIGHT: 65 IN | BODY MASS INDEX: 34.56 KG/M2 | TEMPERATURE: 98 F | SYSTOLIC BLOOD PRESSURE: 110 MMHG | OXYGEN SATURATION: 98 % | HEART RATE: 64 BPM

## 2024-06-13 DIAGNOSIS — R51.9 ACUTE INTRACTABLE HEADACHE, UNSPECIFIED HEADACHE TYPE: Primary | ICD-10-CM

## 2024-06-13 DIAGNOSIS — R51.9 ACUTE INTRACTABLE HEADACHE, UNSPECIFIED HEADACHE TYPE: ICD-10-CM

## 2024-06-13 PROBLEM — R07.89 ATYPICAL CHEST PAIN: Status: RESOLVED | Noted: 2023-05-31 | Resolved: 2024-06-13

## 2024-06-13 PROBLEM — R94.31 ABNORMAL ECG: Status: RESOLVED | Noted: 2024-03-15 | Resolved: 2024-06-13

## 2024-06-13 PROCEDURE — 99214 OFFICE O/P EST MOD 30 MIN: CPT | Mod: S$GLB,,, | Performed by: FAMILY MEDICINE

## 2024-06-13 PROCEDURE — 1159F MED LIST DOCD IN RCRD: CPT | Mod: CPTII,S$GLB,, | Performed by: FAMILY MEDICINE

## 2024-06-13 PROCEDURE — 70450 CT HEAD/BRAIN W/O DYE: CPT | Mod: TC

## 2024-06-13 PROCEDURE — 70450 CT HEAD/BRAIN W/O DYE: CPT | Mod: 26,,, | Performed by: RADIOLOGY

## 2024-06-13 PROCEDURE — 3074F SYST BP LT 130 MM HG: CPT | Mod: CPTII,S$GLB,, | Performed by: FAMILY MEDICINE

## 2024-06-13 PROCEDURE — 3044F HG A1C LEVEL LT 7.0%: CPT | Mod: CPTII,S$GLB,, | Performed by: FAMILY MEDICINE

## 2024-06-13 PROCEDURE — 3008F BODY MASS INDEX DOCD: CPT | Mod: CPTII,S$GLB,, | Performed by: FAMILY MEDICINE

## 2024-06-13 PROCEDURE — 99999 PR PBB SHADOW E&M-EST. PATIENT-LVL IV: CPT | Mod: PBBFAC,,, | Performed by: FAMILY MEDICINE

## 2024-06-13 PROCEDURE — 3078F DIAST BP <80 MM HG: CPT | Mod: CPTII,S$GLB,, | Performed by: FAMILY MEDICINE

## 2024-06-13 PROCEDURE — 1160F RVW MEDS BY RX/DR IN RCRD: CPT | Mod: CPTII,S$GLB,, | Performed by: FAMILY MEDICINE

## 2024-06-13 RX ORDER — FLUTICASONE PROPIONATE 50 MCG
2 SPRAY, SUSPENSION (ML) NASAL DAILY
Qty: 16 G | Refills: 0 | Status: SHIPPED | OUTPATIENT
Start: 2024-06-13

## 2024-06-13 RX ORDER — ONDANSETRON 8 MG/1
8 TABLET, ORALLY DISINTEGRATING ORAL EVERY 6 HOURS PRN
Qty: 20 TABLET | Refills: 0 | Status: SHIPPED | OUTPATIENT
Start: 2024-06-13

## 2024-06-13 RX ORDER — MINERAL OIL
180 ENEMA (ML) RECTAL DAILY
Qty: 30 TABLET | Refills: 0 | Status: SHIPPED | OUTPATIENT
Start: 2024-06-13

## 2024-06-13 RX ORDER — HYDROCODONE BITARTRATE AND ACETAMINOPHEN 5; 325 MG/1; MG/1
1 TABLET ORAL EVERY 6 HOURS PRN
Qty: 20 TABLET | Refills: 0 | Status: SHIPPED | OUTPATIENT
Start: 2024-06-13
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
## 2024-07-18 ENCOUNTER — OFFICE VISIT (OUTPATIENT)
Dept: URGENT CARE | Facility: CLINIC | Age: 34
End: 2024-07-18
Payer: COMMERCIAL

## 2024-07-18 ENCOUNTER — HOSPITAL ENCOUNTER (OUTPATIENT)
Dept: RADIOLOGY | Facility: HOSPITAL | Age: 34
Discharge: HOME | End: 2024-07-18
Payer: COMMERCIAL

## 2024-07-18 VITALS
HEART RATE: 74 BPM | BODY MASS INDEX: 34.66 KG/M2 | TEMPERATURE: 98 F | WEIGHT: 208 LBS | HEIGHT: 65 IN | DIASTOLIC BLOOD PRESSURE: 74 MMHG | RESPIRATION RATE: 20 BRPM | SYSTOLIC BLOOD PRESSURE: 109 MMHG | OXYGEN SATURATION: 96 %

## 2024-07-18 DIAGNOSIS — M25.562 ACUTE PAIN OF LEFT KNEE: Primary | ICD-10-CM

## 2024-07-18 DIAGNOSIS — M25.562 ACUTE PAIN OF LEFT KNEE: ICD-10-CM

## 2024-07-18 PROCEDURE — 73560 X-RAY EXAM OF KNEE 1 OR 2: CPT | Mod: 26,59,RT, | Performed by: STUDENT IN AN ORGANIZED HEALTH CARE EDUCATION/TRAINING PROGRAM

## 2024-07-18 PROCEDURE — 99213 OFFICE O/P EST LOW 20 MIN: CPT | Mod: S$GLB,,,

## 2024-07-18 PROCEDURE — 73562 X-RAY EXAM OF KNEE 3: CPT | Mod: 26,LT,, | Performed by: STUDENT IN AN ORGANIZED HEALTH CARE EDUCATION/TRAINING PROGRAM

## 2024-07-18 PROCEDURE — 73562 X-RAY EXAM OF KNEE 3: CPT | Mod: TC,PO,LT

## 2024-07-18 RX ORDER — NAPROXEN 500 MG/1
500 TABLET ORAL 2 TIMES DAILY
Qty: 20 TABLET | Refills: 0 | Status: SHIPPED | OUTPATIENT
Start: 2024-07-18

## 2024-07-18 NOTE — PATIENT INSTRUCTIONS
R.I.C.E:    Rest, apply ice intermittently, compress with ace wrap, and elevate above heart level as much as possible.  Do not apply ice directly to skin. Wrap ice in cloth before applying.    Please make sure that you wearing appropriate supportive shoes for lower extremity issues.    OTC Tylenol (acetaminophen) up to 4,000 mg a day is safe for short periods and can be used for pain, and fever. However in high doses and prolonged use it can cause liver irritation.    OTC Ibuprofen (NSAID) is a non-steroidal anti-inflammatory that can be used for pain. However it can also cause stomach irritation if over used.    Of note: Aleve, Motrin, Advil, Mobic, meloxicam, and indomethacin are also other names of NSAIDs.    You will need to follow-up with orthopedics if your symptoms persist, as we discussed.

## 2024-07-18 NOTE — PROGRESS NOTES
"Subjective:      Patient ID: Rebecca Shetty is a 34 y.o. female.    Vitals:  height is 5' 5" (1.651 m) and weight is 94.3 kg (208 lb). Her oral temperature is 97.8 °F (36.6 °C). Her blood pressure is 109/74 and her pulse is 74. Her respiration is 20 and oxygen saturation is 96%.     Chief Complaint: Knee Injury    35yo female patient presents to the clinic for left knee pain. Pt reports she injured her left knee at home twisting it last week. Pt reports she has been taking ibuprofen for pain relief. Pt denies any numbness or tingling.    Knee Injury  This is a new problem. The current episode started 1 to 4 weeks ago. The problem occurs constantly. The problem has been unchanged. Pertinent negatives include no abdominal pain, anorexia, arthralgias, change in bowel habit, chest pain, chills, congestion, coughing, diaphoresis, fatigue, fever, headaches, joint swelling, myalgias, nausea, neck pain, numbness, rash, sore throat, swollen glands, urinary symptoms, vertigo, visual change, vomiting or weakness. The symptoms are aggravated by bending, walking and standing. She has tried ice for the symptoms. The treatment provided no relief.       Constitution: Negative for chills, sweating, fatigue and fever.   HENT:  Negative for congestion and sore throat.    Neck: Negative for neck pain.   Cardiovascular:  Negative for chest pain.   Respiratory:  Negative for cough.    Gastrointestinal:  Negative for abdominal pain, nausea and vomiting.   Musculoskeletal:  Positive for pain. Negative for joint pain, joint swelling and muscle ache.   Skin:  Negative for rash.   Neurological:  Negative for history of vertigo, headaches and numbness.      Objective:     Physical Exam   Constitutional: She is oriented to person, place, and time. She appears well-developed. She is cooperative.   HENT:   Head: Normocephalic and atraumatic.   Ears:   Right Ear: Hearing normal.   Left Ear: Hearing normal.   Nose: Nose normal.   Eyes: " Conjunctivae and lids are normal.   Neck: Trachea normal and phonation normal. Neck supple.   Cardiovascular: Normal rate, regular rhythm, normal heart sounds and normal pulses.   Pulmonary/Chest: Effort normal and breath sounds normal.   Abdominal: Normal appearance and bowel sounds are normal. Soft.   Musculoskeletal: Normal range of motion.         General: Tenderness present. No swelling. Normal range of motion.        Legs:       Comments: TTP to medial aspect of knee; FROM; pulse 2+, no bruising or erythema noted   Neurological: She is alert and oriented to person, place, and time. She exhibits normal muscle tone.   Skin: Skin is warm, dry and intact.   Psychiatric: Her speech is normal and behavior is normal. Judgment and thought content normal.   Nursing note and vitals reviewed.    X-ray Knee Ortho Left    Result Date: 7/18/2024  EXAM: XR KNEE ORTHO LEFT CLINICAL HISTORY:  [M25.562]-Pain in left knee. TECHNIQUE: Frontal and sunrise views of the bilateral knees.  Lateral view of the left knee COMPARISON: None available. FINDINGS: There is no acute fracture or dislocation.  There is a small linear density adjacent the right medial femoral condyle. Bone mineralization is normal. No aggressive lytic or blastic lesion. No osseous erosion or aggressive periosteal reaction seen. Joint spaces appear relatively well preserved preserved with normal alignment. No significant joint effusion. Soft tissues are unremarkable.     Unremarkable radiographic appearance of the left knee.  Evidence of prior right MCL injury. Finalized on: 7/18/2024 2:19 PM By:  Des De La Torre MD [facility]# [id number]      2024-07-18 14:21:51.006    [facility]     Assessment:     1. Acute pain of left knee        Plan:       Acute pain of left knee  -     Cancel: XR KNEE 3 VIEW RIGHT; Future; Expected date: 07/18/2024  -     naproxen (NAPROSYN) 500 MG tablet; Take 1 tablet (500 mg total) by mouth 2 (two) times daily.  Dispense: 20 tablet; Refill: 0  -      KNEE BRACE FOR HOME USE  -     Cancel: X-Ray Knee 3 View Left; Future; Expected date: 07/18/2024  -     Ambulatory referral/consult to Orthopedics    Pt in no acute distress. Vitals reassuring. Non-toxic appearing. Discussed xray with pt, no available tech in clinic today. Pt will receive xray at other clinic and will call with results once available. Knee brace applied in clinic. Discussed treatment with naproxen. Will send ortho referral and if pain persists have pt follow up.  Discussed the importance of further evaluation if symptoms worsen. Patient stated verbal understanding.    Patient Instructions   R.I.C.E:    Rest, apply ice intermittently, compress with ace wrap, and elevate above heart level as much as possible.  Do not apply ice directly to skin. Wrap ice in cloth before applying.    Please make sure that you wearing appropriate supportive shoes for lower extremity issues.    OTC Tylenol (acetaminophen) up to 4,000 mg a day is safe for short periods and can be used for pain, and fever. However in high doses and prolonged use it can cause liver irritation.    OTC Ibuprofen (NSAID) is a non-steroidal anti-inflammatory that can be used for pain. However it can also cause stomach irritation if over used.    Of note: Aleve, Motrin, Advil, Mobic, meloxicam, and indomethacin are also other names of NSAIDs.    You will need to follow-up with orthopedics if your symptoms persist, as we discussed.

## 2024-07-24 ENCOUNTER — OFFICE VISIT (OUTPATIENT)
Dept: SPORTS MEDICINE | Facility: CLINIC | Age: 34
End: 2024-07-24
Payer: COMMERCIAL

## 2024-07-24 VITALS — BODY MASS INDEX: 34.63 KG/M2 | WEIGHT: 207.88 LBS | HEIGHT: 65 IN

## 2024-07-24 DIAGNOSIS — S43.431A SUPERIOR GLENOID LABRUM LESION OF RIGHT SHOULDER, INITIAL ENCOUNTER: ICD-10-CM

## 2024-07-24 DIAGNOSIS — S83.412A SPRAIN OF MEDIAL COLLATERAL LIGAMENT OF LEFT KNEE, INITIAL ENCOUNTER: Primary | ICD-10-CM

## 2024-07-24 PROCEDURE — 99203 OFFICE O/P NEW LOW 30 MIN: CPT | Mod: 25,S$GLB,, | Performed by: STUDENT IN AN ORGANIZED HEALTH CARE EDUCATION/TRAINING PROGRAM

## 2024-07-24 PROCEDURE — 1159F MED LIST DOCD IN RCRD: CPT | Mod: CPTII,S$GLB,, | Performed by: STUDENT IN AN ORGANIZED HEALTH CARE EDUCATION/TRAINING PROGRAM

## 2024-07-24 PROCEDURE — 99999 PR PBB SHADOW E&M-EST. PATIENT-LVL III: CPT | Mod: PBBFAC,,, | Performed by: STUDENT IN AN ORGANIZED HEALTH CARE EDUCATION/TRAINING PROGRAM

## 2024-07-24 PROCEDURE — 1160F RVW MEDS BY RX/DR IN RCRD: CPT | Mod: CPTII,S$GLB,, | Performed by: STUDENT IN AN ORGANIZED HEALTH CARE EDUCATION/TRAINING PROGRAM

## 2024-07-24 PROCEDURE — 3008F BODY MASS INDEX DOCD: CPT | Mod: CPTII,S$GLB,, | Performed by: STUDENT IN AN ORGANIZED HEALTH CARE EDUCATION/TRAINING PROGRAM

## 2024-07-24 PROCEDURE — 20610 DRAIN/INJ JOINT/BURSA W/O US: CPT | Mod: RT,S$GLB,, | Performed by: STUDENT IN AN ORGANIZED HEALTH CARE EDUCATION/TRAINING PROGRAM

## 2024-07-24 PROCEDURE — 3044F HG A1C LEVEL LT 7.0%: CPT | Mod: CPTII,S$GLB,, | Performed by: STUDENT IN AN ORGANIZED HEALTH CARE EDUCATION/TRAINING PROGRAM

## 2024-07-24 RX ADMIN — TRIAMCINOLONE ACETONIDE 40 MG: 40 INJECTION, SUSPENSION INTRA-ARTICULAR; INTRAMUSCULAR at 02:07

## 2024-07-24 NOTE — PROGRESS NOTES
"Orthopaedics Sports Medicine     Knee Initial Visit         7/24/2024    Referring MD: Madeleine Leone, NP    Chief Complaint   Patient presents with    Left Knee - Pain    Right Shoulder - Pain, Injury       History of Present Illness:   Rebecca Shetty is a 34 y.o. year old female patient presents with pain and dysfunction involving the left knee. Patient is her on referral from Urgent Care where she was seen on 7/18/24 at which time she noted left knee pain. Pt reported she injured her left knee at home while playing kids the week prior. Reports leg kind of folded up under her and then she fell backwards.  Pt reported she had been taking ibuprofen for pain relief. Pt denies any numbness or tingling.    Current symptoms include left knee pain. Had some swelling initially but this has improved. Symptoms have been improving since her last visit. She denies any instability, mechanical symptoms, or numbness or tingling.     Pain is aggravated by sitting on knees.       Evaluation to date: XR    Treatment to date: Rest, activity modification, ice ibuprofen, Voltaren .     She also reports a right shoulder injury that occurred approximately 4 months ago while she was carrying a heavy hose over her shoulder and her arm got pulled behind her. She noted a "noise" when this occurred. She has had continued pain and limited range of motion of the shoulder since then, especially with overhead movement.       Past Medical History:   Past Medical History:   Diagnosis Date    Gastroesophageal reflux disease without esophagitis 3/24/2023    Hypercholesteremia     Hypertension        Past Surgical History:   Past Surgical History:   Procedure Laterality Date    COLONOSCOPY N/A 11/30/2022    Procedure: COLONOSCOPY;  Surgeon: Dannie Subramanian MD;  Location: [facility];  Service: General;  Laterality: N/A;    ESOPHAGOGASTRODUODENOSCOPY N/A 3/24/2023    Procedure: EGD (ESOPHAGOGASTRODUODENOSCOPY);  Surgeon: Mina PRITCHETT" "MD Amaury;  Location: [facility];  Service: Endoscopy;  Laterality: N/A;    INTRAUTERINE DEVICE INSERTION  05/11/2022    Kyleena    TONSILLECTOMY      TYMPANOSTOMY TUBE PLACEMENT         Medications:  Patient's Medications   New Prescriptions    No medications on file   Previous Medications    ATORVASTATIN (LIPITOR) 20 MG TABLET    Take 1 tablet (20 mg total) by mouth every evening.    FEXOFENADINE (ALLEGRA) 180 MG TABLET    Take 1 tablet (180 mg total) by mouth once daily.    FLUTICASONE PROPIONATE (FLONASE) 50 MCG/ACTUATION NASAL SPRAY    2 sprays (100 mcg total) by Each Nostril route once daily.    HYDROCODONE-ACETAMINOPHEN (NORCO) 5-325 MG PER TABLET    Take 1 tablet by mouth every 6 (six) hours as needed for Pain.    LINACLOTIDE (LINZESS) 72 MCG CAP CAPSULE    Take 1 capsule (72 mcg total) by mouth before breakfast.    NAPROXEN (NAPROSYN) 500 MG TABLET    Take 1 tablet (500 mg total) by mouth 2 (two) times daily.    ONDANSETRON (ZOFRAN-ODT) 8 MG TBDL    Take 1 tablet (8 mg total) by mouth every 6 (six) hours as needed.    PANTOPRAZOLE (PROTONIX) 40 MG TABLET    Take 1 tablet (40 mg total) by mouth once daily.    VARENICLINE (CHANTIX STARTING MONTH BOX) 0.5 MG (11)- 1 MG (42) TABLET    Take one 0.5mg tab by mouth once daily X3 days,then increase to one 0.5mg tab twice daily X4 days,then increase to one 1mg tab twice daily    VARENICLINE (CHANTIX) 1 MG TAB    Take 1 tablet (1 mg total) by mouth 2 (two) times daily.   Modified Medications    No medications on file   Discontinued Medications    No medications on file        Allergies:   Review of patient's allergies indicates:   Allergen Reactions    Ceclor [cefaclor] Other (See Comments)     "Really out of it"    Ciprofloxacin Other (See Comments)     Pt reports that it makes her feel disconnected       Social History:   [place]: [facility] [place]  occupation: [employer]   alcohol use: She reports current alcohol use.  tobacco use: She reports that she " "has been smoking cigarettes. She has never used smokeless tobacco.    Review of systems:  history of recent illness, fevers, shakes, or chills: no  history of cardiac problems or chest pain: no  history of of pulmonary problems or asthma: no  history of diabetes: no  history of prior DVT or clotting problems: no  history of sleep apnea: no    Physical Examination:  Estimated body mass index is 34.6 kg/m² as calculated from the following:    Height as of this encounter: 5' 5" (1.651 m).    Weight as of this encounter: 94.3 kg (207 lb 14.3 oz).    Standing exam  stance: normal alignment, no significant leg-length discrepancy  gait: no limp      Knee      RIGHT  LEFT  Skin:     Intact   Intact  ROM:     0-130  0-130  Effusion:    Neg   Neg  Medial joint line tenderness:  Neg  +  Lateral joint line tenderness:  Neg   Neg  Anny:     Neg   Neg  Patella crepitus:   Neg   Neg  Patella tenderness:   Neg   Neg  Patella grind:      Neg   Neg  Lachman:    Neg   Neg  Valgus stress:    Neg   Neg  Varus stress:    Neg   Neg  Posterior drawer:   Neg   Neg  N-V               intact  intact  Hip:    nml    nml   Lower extremity edema: Negative Negative    Tenderness along left MCL along joint line and proximal portion    Neurovascular exam  - motor function grossly intact bilaterally to hip flexion, knee extension and flexion, ankle dorsiflexion and plantarflexion  - sensation intact to light touch bilaterally to femoral, tibial, tibial and peroneal distributions  - symmetrical pedal pulses    Physical Exam:  RIGHT    LEFT    Scap Dyskinesis/Winging (-)    (-)    Tenderness:          Greater Tuberosity             +    (-)  Bicipital Groove  (-)    (-)  AC joint   (-)    (-)  Other:     ROM:  Forward Elevation 150/170   180  Abduction  100/110   120  ER (at side)  70    80  IR   T8    T8    Strength:   Supraspinatus  4+/5    5/5  Infraspinatus  5/5    5/5  Subscap / IR  5/5    5/5     Special " Tests:   Neer:    (-)    (-)   Tyson:   +    (-)   SS Stress:   +    (-)   Bear Hug:   (-)    (-)   Miner's:   +    (-)   Resisted Thrower's:   +    (-)   Cross Arm Abduction:  (-)    (-)    Neurovascular examination  - Motor grossly intact bilaterally to shoulder abduction, elbow flexion and extension, wrist flexion and extension, and intrinsic hand musculature  - Sensation intact to light touch bilaterally in axillary, median, radial, and ulnar distributions  - Symmetrical radial pulses      Imaging:   XR Results:  Results for orders placed during the hospital encounter of 07/18/24    X-ray Knee Ortho Left    Narrative  EXAM: XR KNEE ORTHO LEFT    CLINICAL HISTORY:  [M25.562]-Pain in left knee.    TECHNIQUE: Frontal and sunrise views of the bilateral knees.  Lateral view of the left knee    COMPARISON: None available.    FINDINGS:  There is no acute fracture or dislocation.  There is a small linear density adjacent the right medial femoral condyle. Bone mineralization is normal. No aggressive lytic or blastic lesion. No osseous erosion or aggressive periosteal reaction seen. Joint spaces appear relatively well preserved preserved with normal alignment. No significant joint effusion. Soft tissues are unremarkable.    Impression  Unremarkable radiographic appearance of the left knee.  Evidence of prior right MCL injury.    Finalized on: 7/18/2024 2:19 PM By:  Des De La Torre MD  [facility]# [id number]      2024-07-18 14:21:[id number]    [facility]      MRI Results:  No results found for this or any previous visit.      CT Results:  No results found for this or any previous visit.      Physician Read: I agree with the above impression.     Impression:  34 y.o. female with left knee MCL sprain and chronic right shoulder pain, suspected biceps versus superior labral pathology      Plan:  Discussed diagnosis and treatment options with the patient today. Her symptoms and physical exam are most consistent with left knee MCL sprain.    For her shoulder, her symptoms and physical exam are most consistent with proximal biceps tendinitis versus superior labral pathology.   I recommend proceeding with right shoulder corticosteroid injection into the glenohumeral joint. Discussed that if her symptoms continue to persist then we may move forward with MRI for further evaluation. The patient is in agreement with this plan.   Procedure performed today and patient tolerated the procedure well with no immediate complications.   Follow-up with me as needed.          Des Huffman MD    I, Raza Lynch, acted as a scribe for Des Huffman MD for the duration of this office visit.

## 2024-07-25 RX ORDER — TRIAMCINOLONE ACETONIDE 40 MG/ML
40 INJECTION, SUSPENSION INTRA-ARTICULAR; INTRAMUSCULAR
Status: DISCONTINUED | OUTPATIENT
Start: 2024-07-24 | End: 2024-07-25 | Stop reason: HOSPADM

## 2024-07-25 NOTE — PROCEDURES
Large Joint Aspiration/Injection: R glenohumeral    Date/Time: 7/24/2024 2:45 PM    Performed by: Des Huffman MD  Authorized by: Des Huffman MD    Consent Done?:  Yes (Verbal)  Indications:  Pain  Site marked: the procedure site was marked    Timeout: prior to procedure the correct patient, procedure, and site was verified    Prep: patient was prepped and draped in usual sterile fashion      Local anesthesia used?: Yes    Anesthesia:  Local infiltration  Local anesthetic:  Lidocaine 1% without epinephrine    Details:  Needle Size:  22 G  Ultrasonic Guidance for needle placement?: No    Approach:  Posterior  Location:  Shoulder  Site:  R glenohumeral  Medications:  40 mg triamcinolone acetonide 40 mg/mL  Patient tolerance:  Patient tolerated the procedure well with no immediate complications

## 2024-08-15 ENCOUNTER — OFFICE VISIT (OUTPATIENT)
Dept: URGENT CARE | Facility: CLINIC | Age: 34
End: 2024-08-15
Payer: COMMERCIAL

## 2024-08-15 VITALS
HEART RATE: 87 BPM | BODY MASS INDEX: 34.49 KG/M2 | HEIGHT: 65 IN | OXYGEN SATURATION: 96 % | DIASTOLIC BLOOD PRESSURE: 76 MMHG | RESPIRATION RATE: 18 BRPM | WEIGHT: 207 LBS | TEMPERATURE: 99 F | SYSTOLIC BLOOD PRESSURE: 123 MMHG

## 2024-08-15 DIAGNOSIS — H92.01 ACUTE OTALGIA, RIGHT: ICD-10-CM

## 2024-08-15 DIAGNOSIS — R21 RASH AND NONSPECIFIC SKIN ERUPTION: Primary | ICD-10-CM

## 2024-08-15 PROCEDURE — 99213 OFFICE O/P EST LOW 20 MIN: CPT | Mod: S$GLB,,,

## 2024-08-15 RX ORDER — PREDNISONE 20 MG/1
TABLET ORAL
Qty: 7 TABLET | Refills: 0 | Status: SHIPPED | OUTPATIENT
Start: 2024-08-15

## 2024-08-15 RX ORDER — PREDNISONE 20 MG/1
TABLET ORAL
Qty: 7 TABLET | Refills: 0 | Status: SHIPPED | OUTPATIENT
Start: 2024-08-15 | End: 2024-08-15

## 2024-08-15 NOTE — PROGRESS NOTES
"Subjective:      Patient ID: Rebecca Shetty is a 34 y.o. female.    Vitals:  height is 5' 5" (1.651 m) and weight is 93.9 kg (207 lb). Her oral temperature is 98.5 °F (36.9 °C). Her blood pressure is 123/76 and her pulse is 87. Her respiration is 18 and oxygen saturation is 96%.     Chief Complaint: Rash and Otalgia    33 y/o patient with improving possible poison ivy rash starting a few days ago.  Patient states also while she is here, she would like her ears to be taken a look at. States having R ear pain (states flares up every now and then due to scar tissue), denies ear drainage, hearing changes.    Patient was cleaning up around her yard on 8/11 and thinks that the rash could be from poison ivy or poison oak. States it has improved in appearance with the use of topical ointment "not calamine." The effected areas are her left arm, left leg, right leg and she thinks it might be on her lower back since it itches but she cannot see it. When she gets hot it gets red and very itchy. She has been using ointment in a clear bottle for poison ivy to treat the areas. When it gets hot, the spots "puff up" and on the back of her right knee it felt like it was blistering 2 days ago. She denies any new exposures to pets, detergents, soaps, clothing, dye, cosmetics, medicines or foods.       The ear pain has been going on for a few days. Patient checked her temp earlier today and it was 100.4 and rechecked and it was 99.2. Currently it is a 3/10 pain. Patient's wife has COVID but she tested herself prior to coming in and her tested resulted negative. Nothing has been used to treat the ear pain.     [facility] is the preferable pharmacy if any prescription needs to be sent tonight because their regular one is going to be closed. Allergic to ciprofloxacin and ceclor.     Rash  This is a new problem. The current episode started in the past 7 days. The affected locations include the right upper leg, left arm, back, left " upper leg and left lower leg. The rash is characterized by itchiness, redness and blistering. Associated symptoms include a fever (resolved). Pertinent negatives include no congestion, cough, rhinorrhea or sore throat.   Otalgia   There is pain in both ears. The current episode started in the past 7 days. The maximum temperature recorded prior to her arrival was 100.4 - 100.9 F. The pain is at a severity of 10/10. Associated symptoms include a rash. Pertinent negatives include no coughing, rhinorrhea or sore throat. She has tried nothing for the symptoms.       Constitution: Positive for fever (resolved). Negative for chills.   HENT:  Positive for ear pain (right). Negative for congestion, sore throat and voice change.    Cardiovascular:  Negative for chest pain.   Eyes:  Negative for eye discharge and eye redness.   Respiratory:  Negative for cough, stridor and wheezing.    Skin:  Positive for rash.   Allergic/Immunologic: Negative for sneezing.      Objective:     Vitals:    08/15/24 1826   BP: 123/76   Pulse: 87   Resp: 18   Temp: 98.5 °F (36.9 °C)       Physical Exam   Constitutional: She is oriented to person, place, and time. She appears well-developed. She is cooperative.  Non-toxic appearance. She does not appear ill. No distress.   HENT:   Head: Normocephalic and atraumatic.   Ears:   Right Ear: Hearing, external ear and ear canal normal. No no drainage, swelling or tenderness. Tympanic membrane is scarred. Tympanic membrane is not erythematous and not bulging. no impacted cerumen  Left Ear: Hearing, tympanic membrane, external ear and ear canal normal. No no drainage, swelling or tenderness. Tympanic membrane is not erythematous and not bulging. no impacted cerumen  Nose: Nose normal. No mucosal edema, rhinorrhea or nasal deformity. No epistaxis. Right sinus exhibits no maxillary sinus tenderness and no frontal sinus tenderness. Left sinus exhibits no maxillary sinus tenderness and no frontal sinus  tenderness.   Mouth/Throat: Uvula is midline, oropharynx is clear and moist and mucous membranes are normal. No trismus in the jaw. Normal dentition. No uvula swelling. No oropharyngeal exudate, posterior oropharyngeal edema, posterior oropharyngeal erythema or cobblestoning.   No signs of angioedema      Comments: No signs of angioedema  Eyes: Conjunctivae and lids are normal. Pupils are equal, round, and reactive to light. Right eye exhibits no discharge. Left eye exhibits no discharge. No scleral icterus.   Neck: Trachea normal and phonation normal. Neck supple. No edema present. No erythema present. No neck rigidity present.   Cardiovascular: Normal rate, regular rhythm, normal heart sounds and normal pulses.   Pulmonary/Chest: Effort normal and breath sounds normal. No stridor. No respiratory distress. She has no decreased breath sounds. She has no wheezes. She has no rhonchi. She has no rales.   Abdominal: Normal appearance.   Musculoskeletal: Normal range of motion.         General: No deformity. Normal range of motion.   Neurological: She is alert and oriented to person, place, and time. She exhibits normal muscle tone. Coordination normal.   Skin: Skin is warm, dry, intact, not diaphoretic, not pale, rash and papular.        Psychiatric: Her speech is normal and behavior is normal. Judgment and thought content normal.   Nursing note and vitals reviewed.      Assessment:     1. Rash and nonspecific skin eruption    2. Acute otalgia, right        Plan:       Rash and nonspecific skin eruption  -     predniSONE (DELTASONE) 20 MG tablet; Take 2 tabs on days 1-2, take 1 tab on days 3-4, take half a tab on days 5-6  Dispense: 7 tablet; Refill: 0    Acute otalgia, right  -     predniSONE (DELTASONE) 20 MG tablet; Take 2 tabs on days 1-2, take 1 tab on days 3-4, take half a tab on days 5-6  Dispense: 7 tablet; Refill: 0        Patient Instructions   RASH:    --steroid prescription given  --continue  hydrocortisone/topical OTC cream for itching  --do not scratch  --keep skin moisturized  --recommend oatmeal baths  --avoid allergens  --you can apply topical benadryl or take oral benadryl tonight for itching/inflammation; this will make you drowsy so do not operate any heavy machinery while on medication  --follow-up with PCP this week or consider seeing dermatology if this continues       Humidifier use at home.  Warm compresses to affected ear (right)  Elevate head on a pillow at night   Flonase Nasal Spray as directed for nasal congestion  Over the Counter Claritin or Zyrtec (plain) as directed for allergy symptoms  Tylenol or Motrin every 4 - 6 hours as needed for fever or ear pain.  Follow up with your PCP in 1 week of initiating antibiotics or sooner for no improvement in symptoms  Follow up in the ER for any worsening of symptoms such as new fever, increasing ear pain, neck stiffness, shortness of breath, etc.  If you smoke, stop smoking.

## 2024-08-15 NOTE — PATIENT INSTRUCTIONS
RASH:    --steroid prescription given  --continue hydrocortisone/topical OTC cream for itching  --do not scratch  --keep skin moisturized  --recommend oatmeal baths  --avoid allergens  --you can apply topical benadryl or take oral benadryl tonight for itching/inflammation; this will make you drowsy so do not operate any heavy machinery while on medication  --follow-up with PCP this week or consider seeing dermatology if this continues       Humidifier use at home.  Warm compresses to affected ear (right)  Elevate head on a pillow at night   Flonase Nasal Spray as directed for nasal congestion  Over the Counter Claritin or Zyrtec (plain) as directed for allergy symptoms  Tylenol or Motrin every 4 - 6 hours as needed for fever or ear pain.  Follow up with your PCP in 1 week of initiating antibiotics or sooner for no improvement in symptoms  Follow up in the ER for any worsening of symptoms such as new fever, increasing ear pain, neck stiffness, shortness of breath, etc.  If you smoke, stop smoking.

## 2024-09-09 ENCOUNTER — TELEPHONE (OUTPATIENT)
Dept: FAMILY MEDICINE | Facility: CLINIC | Age: 34
End: 2024-09-09
Payer: COMMERCIAL

## 2024-09-09 NOTE — TELEPHONE ENCOUNTER
Spoke to pt, she states she was calling to see if clinic is open Wednesday. Informed her that yes as of right now we are. Offered pt to be seen today but she says it has to be a Wednesday since she is off of work. Will call with any changes. She verbalized understanding.

## 2024-09-09 NOTE — TELEPHONE ENCOUNTER
----- Message from Cristina Espinoza sent at 9/9/2024  7:22 AM CDT -----  Type:  Needs Medical Advice    Who Called: pt  Symptoms (please be specific): na   How long has patient had these symptoms:  na  Pharmacy name and phone #:  na  Would the patient rather a call back or a response via [facility]? call  Best Call Back Number: [phone]    Additional Information: requesting a call back asap as she has appt for Wed but is concerned on if the clinic will be open still due to weather and if so what time should she come in.

## 2024-09-25 ENCOUNTER — OFFICE VISIT (OUTPATIENT)
Dept: FAMILY MEDICINE | Facility: CLINIC | Age: 34
End: 2024-09-25
Attending: FAMILY MEDICINE
Payer: COMMERCIAL

## 2024-09-25 VITALS
OXYGEN SATURATION: 98 % | TEMPERATURE: 98 F | HEART RATE: 89 BPM | HEIGHT: 65 IN | BODY MASS INDEX: 34.08 KG/M2 | SYSTOLIC BLOOD PRESSURE: 122 MMHG | WEIGHT: 204.56 LBS | DIASTOLIC BLOOD PRESSURE: 80 MMHG

## 2024-09-25 DIAGNOSIS — R05.9 COUGH, UNSPECIFIED TYPE: Primary | ICD-10-CM

## 2024-09-25 DIAGNOSIS — J01.40 ACUTE PANSINUSITIS, RECURRENCE NOT SPECIFIED: ICD-10-CM

## 2024-09-25 DIAGNOSIS — J02.9 SORE THROAT: ICD-10-CM

## 2024-09-25 LAB
CTP QC/QA: YES
MOLECULAR STREP A: NEGATIVE
POC MOLECULAR INFLUENZA A AGN: NEGATIVE
POC MOLECULAR INFLUENZA B AGN: NEGATIVE
SARS-COV-2 RDRP RESP QL NAA+PROBE: NEGATIVE

## 2024-09-25 PROCEDURE — 99999 PR PBB SHADOW E&M-EST. PATIENT-LVL IV: CPT | Mod: PBBFAC,,, | Performed by: FAMILY MEDICINE

## 2024-09-25 PROCEDURE — 87070 CULTURE OTHR SPECIMN AEROBIC: CPT | Performed by: FAMILY MEDICINE

## 2024-09-25 RX ORDER — DOXYCYCLINE 100 MG/1
100 CAPSULE ORAL 2 TIMES DAILY
Qty: 20 CAPSULE | Refills: 0 | Status: SHIPPED | OUTPATIENT
Start: 2024-09-25 | End: 2024-10-05

## 2024-09-25 NOTE — PROGRESS NOTES
Subjective:       Patient ID: Rebecca Shetty is a 34 y.o. female.    Chief Complaint: Sinus Problem (Cough, runny nose, nasal congestion, x 3 days ) and Knee Pain (/)    34 y old female with nasal congestion , productive cough , body aches and fever for 4 days . She just got off a cruise. Using Flonase, otc cough suppressant and sudafed.       Review of Systems   Constitutional: Negative.    HENT:  Positive for sinus pressure and sinus pain.    Eyes: Negative.    Respiratory:  Positive for cough.    Cardiovascular: Negative.    Gastrointestinal: Negative.    Genitourinary: Negative.    Musculoskeletal: Negative.    Skin: Negative.    Hematological: Negative.        Objective:      Physical Exam  Vitals and nursing note reviewed.   Constitutional:       General: She is not in acute distress.     Appearance: She is well-developed. She is not diaphoretic.   HENT:      Head: Normocephalic and atraumatic.      Right Ear: External ear normal. A middle ear effusion is present.      Left Ear: External ear normal. A middle ear effusion is present.      Nose: Nose normal.      Mouth/Throat:      Pharynx: Posterior oropharyngeal erythema present.   Eyes:      General: No scleral icterus.        Left eye: No discharge.      Conjunctiva/sclera: Conjunctivae normal.      Pupils: Pupils are equal, round, and reactive to light.   Neck:      Thyroid: No thyromegaly.      Vascular: No JVD.      Trachea: No tracheal deviation.   Cardiovascular:      Rate and Rhythm: Normal rate and regular rhythm.      Heart sounds: Normal heart sounds. No murmur heard.     No friction rub. No gallop.   Pulmonary:      Effort: Pulmonary effort is normal. No respiratory distress.      Breath sounds: Normal breath sounds. No wheezing or rales.   Chest:      Chest wall: No tenderness.   Abdominal:      General: Bowel sounds are normal. There is no distension.      Palpations: Abdomen is soft. There is no mass.      Tenderness: There is no abdominal  tenderness. There is no guarding.   Musculoskeletal:      Cervical back: Normal range of motion and neck supple.   Lymphadenopathy:      Cervical: No cervical adenopathy.         Assessment:         Rebecca was seen today for sinus problem and knee pain.    Diagnoses and all orders for this visit:    Cough, unspecified type  -     POCT Strep A, Molecular  -     POCT COVID-19 Rapid Screening  -     POCT Influenza A/B Molecular    Sore throat  -     CULTURE, RESPIRATORY  - THROAT    Acute pansinusitis, recurrence not specified    Other orders  -     doxycycline (VIBRAMYCIN) 100 MG Cap; Take 1 capsule (100 mg total) by mouth 2 (two) times daily. for 10 days       Plan:     Rebecca was seen today for sinus problem and knee pain.    Diagnoses and all orders for this visit:    Cough, unspecified type  -     POCT Strep A, Molecular  -     POCT COVID-19 Rapid Screening  -     POCT Influenza A/B Molecular     Call or return to clinic prn if these symptoms worsen or fail to improve as anticipated.

## 2024-09-28 LAB — BACTERIA THROAT CULT: NORMAL

## 2024-10-02 ENCOUNTER — TELEPHONE (OUTPATIENT)
Dept: SPORTS MEDICINE | Facility: CLINIC | Age: 34
End: 2024-10-02
Payer: COMMERCIAL

## 2024-10-02 DIAGNOSIS — M25.561 RIGHT KNEE PAIN, UNSPECIFIED CHRONICITY: Primary | ICD-10-CM

## 2025-03-10 DIAGNOSIS — M25.511 RIGHT SHOULDER PAIN, UNSPECIFIED CHRONICITY: Primary | ICD-10-CM

## 2025-03-12 ENCOUNTER — OFFICE VISIT (OUTPATIENT)
Dept: SPORTS MEDICINE | Facility: CLINIC | Age: 35
End: 2025-03-12
Payer: COMMERCIAL

## 2025-03-12 ENCOUNTER — HOSPITAL ENCOUNTER (OUTPATIENT)
Dept: RADIOLOGY | Facility: HOSPITAL | Age: 35
Discharge: HOME | End: 2025-03-12
Attending: STUDENT IN AN ORGANIZED HEALTH CARE EDUCATION/TRAINING PROGRAM
Payer: COMMERCIAL

## 2025-03-12 VITALS — RESPIRATION RATE: 17 BRPM | BODY MASS INDEX: 34.08 KG/M2 | WEIGHT: 204.56 LBS | HEIGHT: 65 IN

## 2025-03-12 DIAGNOSIS — G89.29 CHRONIC RIGHT SHOULDER PAIN: Primary | ICD-10-CM

## 2025-03-12 DIAGNOSIS — M25.511 CHRONIC RIGHT SHOULDER PAIN: Primary | ICD-10-CM

## 2025-03-12 DIAGNOSIS — M25.511 RIGHT SHOULDER PAIN, UNSPECIFIED CHRONICITY: ICD-10-CM

## 2025-03-12 PROCEDURE — 1160F RVW MEDS BY RX/DR IN RCRD: CPT | Mod: CPTII,S$GLB,, | Performed by: STUDENT IN AN ORGANIZED HEALTH CARE EDUCATION/TRAINING PROGRAM

## 2025-03-12 PROCEDURE — 1159F MED LIST DOCD IN RCRD: CPT | Mod: CPTII,S$GLB,, | Performed by: STUDENT IN AN ORGANIZED HEALTH CARE EDUCATION/TRAINING PROGRAM

## 2025-03-12 PROCEDURE — 3008F BODY MASS INDEX DOCD: CPT | Mod: CPTII,S$GLB,, | Performed by: STUDENT IN AN ORGANIZED HEALTH CARE EDUCATION/TRAINING PROGRAM

## 2025-03-12 PROCEDURE — 73030 X-RAY EXAM OF SHOULDER: CPT | Mod: TC,RT

## 2025-03-12 PROCEDURE — 99214 OFFICE O/P EST MOD 30 MIN: CPT | Mod: S$GLB,,, | Performed by: STUDENT IN AN ORGANIZED HEALTH CARE EDUCATION/TRAINING PROGRAM

## 2025-03-12 PROCEDURE — 99999 PR PBB SHADOW E&M-EST. PATIENT-LVL IV: CPT | Mod: PBBFAC,,, | Performed by: STUDENT IN AN ORGANIZED HEALTH CARE EDUCATION/TRAINING PROGRAM

## 2025-03-12 PROCEDURE — 73030 X-RAY EXAM OF SHOULDER: CPT | Mod: 26,RT,, | Performed by: RADIOLOGY

## 2025-03-12 NOTE — PROGRESS NOTES
"Orthopaedic Follow-Up Visit    Last Appointment: 7/24/24  Diagnosis: chronic right shoulder pain, suspected biceps versus superior labral pathology   Prior Procedure: R GH CSI    Rebecca Shetty is a 35 y.o. female who is here for f/u evaluation of her right shoulder. The patient was last seen here by me on 7/24/24 at which point her symptoms and physical exam were most consistent with proximal biceps tendinitis versus superior labral pathology. I recommend proceeding with right shoulder corticosteroid injection into the glenohumeral joint. Discussed that if her symptoms continue to persist then we may move forward with MRI for further evaluation. The patient returns today reporting continued pain in her right shoulder. She reports approximately 2-3 weeks of relief with the CSI. Her activities at work have needed to be modified due to her pain.     To review her history, Rebecca Shetty is a 35 y.o. female who presented on 7/24/24 at which time she reported a right shoulder injury that occurred approximately 4 months prior while she was carrying a heavy hose over her shoulder and her arm got pulled behind her. She noted a "noise" when this occurred. She has had continued pain and limited range of motion of the shoulder since then, especially with overhead movement.    Patient's medications, allergies, past medical, surgical, social and family histories were reviewed and updated as appropriate.    Review of Systems   All systems reviewed were negative.  Specifically, the patient denies fever, chills, weight loss, chest pain, shortness of breath, or dyspnea on exertion.      Past Medical History:   Diagnosis Date    Gastroesophageal reflux disease without esophagitis 3/24/2023    Hypercholesteremia     Hypertension        Past Surgical History:   Procedure Laterality Date    COLONOSCOPY N/A 11/30/2022    Procedure: COLONOSCOPY;  Surgeon: Dannie Subramanian MD;  Location: [facility];  Service: General;  " Laterality: N/A;    ESOPHAGOGASTRODUODENOSCOPY N/A 3/24/2023    Procedure: EGD (ESOPHAGOGASTRODUODENOSCOPY);  Surgeon: Mina Rebolledo MD;  Location: [facility];  Service: Endoscopy;  Laterality: N/A;    INTRAUTERINE DEVICE INSERTION  05/11/2022    Kyleena    TONSILLECTOMY      TYMPANOSTOMY TUBE PLACEMENT         Patient's Medications   New Prescriptions    No medications on file   Previous Medications    ATORVASTATIN (LIPITOR) 20 MG TABLET    Take 1 tablet (20 mg total) by mouth every evening.    FLUTICASONE PROPIONATE (FLONASE) 50 MCG/ACTUATION NASAL SPRAY    2 sprays (100 mcg total) by Each Nostril route once daily.    LINACLOTIDE (LINZESS) 72 MCG CAP CAPSULE    Take 1 capsule (72 mcg total) by mouth before breakfast.    PANTOPRAZOLE (PROTONIX) 40 MG TABLET    Take 1 tablet (40 mg total) by mouth once daily.    PREDNISONE (DELTASONE) 20 MG TABLET    Take 2 tabs on days 1-2, take 1 tab on days 3-4, take half a tab on days 5-6    VARENICLINE (CHANTIX STARTING MONTH BOX) 0.5 MG (11)- 1 MG (42) TABLET    Take one 0.5mg tab by mouth once daily X3 days,then increase to one 0.5mg tab twice daily X4 days,then increase to one 1mg tab twice daily    VARENICLINE (CHANTIX) 1 MG TAB    Take 1 tablet (1 mg total) by mouth 2 (two) times daily.   Modified Medications    No medications on file   Discontinued Medications    No medications on file       Family History   Problem Relation Name Age of Onset    Pancreatic cancer Mother Lucy 44        chemo, surgery    Other Father Adán         Sturge-Nguyen    Heart disease Father Adán 55        CAD    Seizures Father Adán     Diabetes Father Adán     Hypertension Father Adán     Hyperlipidemia Father Adán     Schizophrenia Father Adán     Piter's disease Brother Adán C     Aneurysm Maternal Aunt          brain    Heart attack Maternal Grandmother      Stomach cancer Maternal Grandfather          possibly metastasis to brain    Heart attack Paternal Grandmother    "   Cancer Paternal Grandfather          type uk    Other Other          benign mass in arm    Other Other          blood disorder? (has to have blood filtered)    Breast cancer Other          metastasis to lungs    Skin cancer Other          type uk    COPD Neg Hx         Review of patient's allergies indicates:   Allergen Reactions    Ceclor [cefaclor] Other (See Comments)     "Really out of it"    Ciprofloxacin Other (See Comments)     Pt reports that it makes her feel disconnected         Objective:      Physical Exam  Patient is alert and oriented, no distress. Skin is intact. Neuro is normal with no focal motor or sensory findings.    Cervical exam is unremarkable. Intact cervical ROM. Negative Spurling's test    Physical Exam:                       RIGHT                                     LEFT     Scap Dyskinesis/Winging       (-)                                             (-)     Tenderness:                                                                              Greater Tuberosity                  +                                              (-)  Bicipital Groove                       (-)                                             (-)  AC joint                                   (-)                                             (-)  Other:      ROM:  Forward Elevation       150/170                                   180  Abduction                    100/110                                   120  ER (at side)                 70                                            80  IR                                 T8                                            T8     Strength:   Supraspinatus             4+/5                                         5/5  Infraspinatus               5/5                                           5/5  Subscap / IR               5/5                                           5/5      Special Tests:              Neer:                                       (-)                           "                   (-)              Tyson:                                 +                                              (-)              SS Stress:                               +                                              (-)              Bear Hug:                                (-)                                             (-)              [place]'s:                                 +                                              (-)              Resisted Thrower's:                +                                              (-)              Cross Arm Abduction:             (-)                                             (-)    Neurovascular examination  - Motor grossly intact bilaterally to shoulder abduction, elbow flexion and extension, wrist flexion and extension, and intrinsic hand musculature  - Sensation intact to light touch bilaterally in axillary, median, radial, and ulnar distributions  - Symmetrical radial pulses    Imaging:    XR Results:  X-ray Shoulder 2 or More Views Right  Narrative: EXAM: XR SHOULDER COMPLETE 2 OR MORE VIEWS RIGHT    CLINICAL HISTORY:  [M25.511]-Pain in right shoulder.    FINDINGS: Glenohumeral and acromioclavicular alignment is normal. No fracture or other acute osseous abnormality is seen.  The joint spaces of the shoulder are well-maintained without evidence of significant arthropathy.  No evidence of rotator cuff or bursal calcium deposition.  Impression:  No acute abnormality identified in the right shoulder.    Finalized on: 3/12/2025 10:03 AM By:  Fabiano Espino MD  [facility]# [id number]      2025-03-12 10:05:[id number]     [facility]          MRI Results:  No results found for this or any previous visit.      CT Results:  No results found for this or any previous visit.      Physician read: I agree with the above impression.    Assessment/Plan:   Rebecca Shetty is a 35 y.o. female with chronic right shoulder pain, suspected biceps versus superior labral  pathology    Plan:    She sustained an injury to the right shoulder in July 2024. Her physical exam is most consistent biceps versus superior labral pathology. I also have some concerns for her rotator cuff.  Her symptoms have persisted despite glenohumeral CSI. I recommend proceeding with MRI for further evaluation at this time. The patient is in agreement with this plan.   Follow up with me after MRI.            Des Huffman MD    I, Raza Lynch, acted as a scribe for Des Huffman MD for the duration of this office visit.

## 2025-03-17 ENCOUNTER — HOSPITAL ENCOUNTER (OUTPATIENT)
Dept: RADIOLOGY | Facility: HOSPITAL | Age: 35
Discharge: HOME | End: 2025-03-17
Attending: STUDENT IN AN ORGANIZED HEALTH CARE EDUCATION/TRAINING PROGRAM
Payer: COMMERCIAL

## 2025-03-17 DIAGNOSIS — G89.29 CHRONIC RIGHT SHOULDER PAIN: ICD-10-CM

## 2025-03-17 DIAGNOSIS — M25.511 CHRONIC RIGHT SHOULDER PAIN: ICD-10-CM

## 2025-03-17 DIAGNOSIS — K21.9 GASTROESOPHAGEAL REFLUX DISEASE, UNSPECIFIED WHETHER ESOPHAGITIS PRESENT: ICD-10-CM

## 2025-03-17 PROCEDURE — 73221 MRI JOINT UPR EXTREM W/O DYE: CPT | Mod: TC,PN,RT

## 2025-03-17 PROCEDURE — 73221 MRI JOINT UPR EXTREM W/O DYE: CPT | Mod: 26,RT,, | Performed by: RADIOLOGY

## 2025-03-17 NOTE — PROGRESS NOTES
"Orthopaedic Follow-Up Visit    Last Appointment: 3/12/25  Diagnosis: chronic right shoulder pain, suspected biceps versus superior labral pathology  Prior Procedure: MRI    Rebecca Shetty is a 35 y.o. female who is here for f/u evaluation of her right shoulder. The patient was last seen here by me on 3/12/25 at which point she noted continued symptoms in her shoulder so we elected to proceed with MRI for further evaluation.   The patient returns today to review her MRI and discuss further treatment options. Symptoms are unchanged from last visit.    To review her history, Rebecca Shetty is a 35 y.o. female who presented on 7/24/24 at which time she reported a right shoulder injury that occurred approximately 4 months prior while she was carrying a heavy hose over her shoulder and her arm got pulled behind her. She noted a "noise" when this occurred. She has had continued pain and limited range of motion of the shoulder since then, especially with overhead movement. Her symptoms and physical exam were most consistent with proximal biceps tendinitis versus superior labral pathology. I recommend proceeding with right shoulder corticosteroid injection into the glenohumeral joint. Discussed that if her symptoms continue to persist then we may move forward with MRI for further evaluation.     Patient's medications, allergies, past medical, surgical, social and family histories were reviewed and updated as appropriate.    Review of Systems   All systems reviewed were negative.  Specifically, the patient denies fever, chills, weight loss, chest pain, shortness of breath, or dyspnea on exertion.      Past Medical History:   Diagnosis Date    Gastroesophageal reflux disease without esophagitis 3/24/2023    Hypercholesteremia     Hypertension        Past Surgical History:   Procedure Laterality Date    COLONOSCOPY N/A 11/30/2022    Procedure: COLONOSCOPY;  Surgeon: Dannie Subramanian MD;  Location: [facility];  " Service: General;  Laterality: N/A;    ESOPHAGOGASTRODUODENOSCOPY N/A 3/24/2023    Procedure: EGD (ESOPHAGOGASTRODUODENOSCOPY);  Surgeon: Mina Rebolledo MD;  [facility];  Service: Endoscopy;  Laterality: N/A;    INTRAUTERINE DEVICE INSERTION  05/11/2022    Kyleena    TONSILLECTOMY      TYMPANOSTOMY TUBE PLACEMENT         Patient's Medications   New Prescriptions    No medications on file   Previous Medications    ATORVASTATIN (LIPITOR) 20 MG TABLET    Take 1 tablet (20 mg total) by mouth every evening.    FLUTICASONE PROPIONATE (FLONASE) 50 MCG/ACTUATION NASAL SPRAY    2 sprays (100 mcg total) by Each Nostril route once daily.    LINACLOTIDE (LINZESS) 72 MCG CAP CAPSULE    Take 1 capsule (72 mcg total) by mouth before breakfast.    PANTOPRAZOLE (PROTONIX) 40 MG TABLET    Take 1 tablet (40 mg total) by mouth once daily.    PREDNISONE (DELTASONE) 20 MG TABLET    Take 2 tabs on days 1-2, take 1 tab on days 3-4, take half a tab on days 5-6    VARENICLINE (CHANTIX STARTING MONTH BOX) 0.5 MG (11)- 1 MG (42) TABLET    Take one 0.5mg tab by mouth once daily X3 days,then increase to one 0.5mg tab twice daily X4 days,then increase to one 1mg tab twice daily    VARENICLINE (CHANTIX) 1 MG TAB    Take 1 tablet (1 mg total) by mouth 2 (two) times daily.   Modified Medications    No medications on file   Discontinued Medications    No medications on file       Family History   Problem Relation Name Age of Onset    Pancreatic cancer Mother Lucy 44        chemo, surgery    Other Father Adán         Sturge-Nguyen    Heart disease Father Adán 55        CAD    Seizures Father Adán     Diabetes Father Adán     Hypertension Father Adán     Hyperlipidemia Father Adán     Schizophrenia Father Adán     Piter's disease Brother Adán C     Aneurysm Maternal Aunt          brain    Heart attack Maternal Grandmother      Stomach cancer Maternal Grandfather          possibly metastasis to brain    Heart attack  "Paternal Grandmother      Cancer Paternal Grandfather          type uk    Other Other          benign mass in arm    Other Other          blood disorder? (has to have blood filtered)    Breast cancer Other          metastasis to lungs    Skin cancer Other          type uk    COPD Neg Hx         Review of patient's allergies indicates:   Allergen Reactions    Ceclor [cefaclor] Other (See Comments)     "Really out of it"    Ciprofloxacin Other (See Comments)     Pt reports that it makes her feel disconnected         Objective:      Physical Exam  Patient is alert and oriented, no distress. Skin is intact. Neuro is normal with no focal motor or sensory findings.    Cervical exam is unremarkable. Intact cervical ROM. Negative Spurling's test    Physical Exam:                       RIGHT                                     LEFT     Scap Dyskinesis/Winging       (-)                                             (-)     Tenderness:                                                                              Greater Tuberosity                  +                                              (-)  Bicipital Groove                       (-)                                             (-)  AC joint                                   (-)                                             (-)  Other:      ROM:  Forward Elevation       150/170                                   180  Abduction                    100/110                                   120  ER (at side)                 70                                            80  IR                                 T8                                            T8     Strength:   Supraspinatus             4+/5                                         5/5  Infraspinatus               5/5                                           5/5  Subscap / IR               5/5                                           5/5      Special Tests:              Neer:                                       +      "                                        (-)              Tyson:                                 +                                              (-)              SS Stress:                               +                                              (-)              Bear Hug:                                (-)                                             (-)              [place]'s:                                 +                                              (-)              Resisted Thrower's:                +                                              (-)              Cross Arm Abduction:             (-)                                             (-)    Neurovascular examination  - Motor grossly intact bilaterally to shoulder abduction, elbow flexion and extension, wrist flexion and extension, and intrinsic hand musculature  - Sensation intact to light touch bilaterally in axillary, median, radial, and ulnar distributions  - Symmetrical radial pulses    Imaging:    XR Results:  Results for orders placed during the hospital encounter of 03/12/25    X-ray Shoulder 2 or More Views Right    Narrative  EXAM: XR SHOULDER COMPLETE 2 OR MORE VIEWS RIGHT    CLINICAL HISTORY:  [M25.511]-Pain in right shoulder.    FINDINGS: Glenohumeral and acromioclavicular alignment is normal. No fracture or other acute osseous abnormality is seen.  The joint spaces of the shoulder are well-maintained without evidence of significant arthropathy.  No evidence of rotator cuff or bursal calcium deposition.    Impression  No acute abnormality identified in the right shoulder.    Finalized on: 3/12/2025 10:03 AM By:  Fabiano Espino MD  [facility]# [id number]      2025-03-12 10:05:12.768     [facility]      MRI Results:  Results for orders placed during the hospital encounter of 03/17/25    MRI Shoulder Without Contrast Right    Narrative  EXAM:  MRI SHOULDER WITHOUT CONTRAST RIGHT    CLINICAL HISTORY: Right shoulder pain. Shoulder pain, rotator cuff  disorder suspected, xray done; [M25.511]-Pain in right shoulder./[G89.29]-Other chronic pain.    TECHNIQUE: Standard multiplanar, multisequence MR imaging protocol of the right shoulder was performed.    FINDINGS:  Rotator cuff tendons: Mild supraspinatus and infraspinatus tendinosis.  Low-grade partial articular surface delaminating tear of the distal supraspinatus tendon at the footprint and critical zone measuring approximately 1 cm in AP dimension. No other rotator cuff tear identified.    Muscles: Within normal limits.    Bursitis: Minor subacromial bursitis.    Long head biceps tendon: Within normal limits.    Labrum and glenohumeral ligaments: No discernible tears.    Bones: No fractures, marrow edema or suspicious bone lesions.    Glenohumeral joint: No full-thickness chondral defects identified. Normal alignment.    AC joint: No significant osteoarthritis.  Normal alignment.    Acromial arch: Type 1 acromion without downsloping.  No os acromiale.    Impression  Mild supraspinatus and infraspinatus tendinosis.  Low-grade partial tear of the supraspinatus tendon.  Mild subacromial bursitis.  Otherwise unremarkable exam.    Finalized on: 3/17/2025 10:38 AM By:  Fabiano Espino MD  [facility]# [id number]      2025-03-17 10:40:59.582     [facility]      CT Results:  No results found for this or any previous visit.          Physician read: I agree with the above impression.    Assessment/Plan:   Rebecca Shetty is a 35 y.o. female with right shoulder rotator cuff tendinosis, low-grade partial thickness tearing, subacromial bursitis      Plan:    Reviewed MRI with the patient today. Her MRI shows that her rotator cuff is grossly intact with tendinosis and low-grade partial thickness rotator cuff tear. Reassured her this is tiny and can be treated non-operatively. She also has subacromial bursitis present on her MRI.   Reassured her that I do not see any pathology on her MRI that I would recommend proceeding with operative  treatment at this time. Discussed that we previously performed glenohumeral CSI, so  I would recommend targeting her bursitis with subacromial space CSI at this time. The patient is in agreement with this plan.  Procedure performed today and patient tolerated the procedure well with no immediate complications.   Follow up with me as needed           Des Huffman MD    I, Raza Lynch, acted as a scribe for Des Huffman MD for the duration of this office visit.

## 2025-03-18 ENCOUNTER — OFFICE VISIT (OUTPATIENT)
Dept: SPORTS MEDICINE | Facility: CLINIC | Age: 35
End: 2025-03-18
Payer: COMMERCIAL

## 2025-03-18 VITALS — WEIGHT: 204.56 LBS | HEIGHT: 65 IN | BODY MASS INDEX: 34.08 KG/M2

## 2025-03-18 DIAGNOSIS — M67.813 TENDINOSIS OF RIGHT ROTATOR CUFF: Primary | ICD-10-CM

## 2025-03-18 DIAGNOSIS — M75.51 SUBACROMIAL BURSITIS OF RIGHT SHOULDER JOINT: ICD-10-CM

## 2025-03-18 PROCEDURE — 1159F MED LIST DOCD IN RCRD: CPT | Mod: CPTII,S$GLB,, | Performed by: STUDENT IN AN ORGANIZED HEALTH CARE EDUCATION/TRAINING PROGRAM

## 2025-03-18 PROCEDURE — 3008F BODY MASS INDEX DOCD: CPT | Mod: CPTII,S$GLB,, | Performed by: STUDENT IN AN ORGANIZED HEALTH CARE EDUCATION/TRAINING PROGRAM

## 2025-03-18 PROCEDURE — 1160F RVW MEDS BY RX/DR IN RCRD: CPT | Mod: CPTII,S$GLB,, | Performed by: STUDENT IN AN ORGANIZED HEALTH CARE EDUCATION/TRAINING PROGRAM

## 2025-03-18 PROCEDURE — 99214 OFFICE O/P EST MOD 30 MIN: CPT | Mod: 25,S$GLB,, | Performed by: STUDENT IN AN ORGANIZED HEALTH CARE EDUCATION/TRAINING PROGRAM

## 2025-03-18 PROCEDURE — 20610 DRAIN/INJ JOINT/BURSA W/O US: CPT | Mod: RT,S$GLB,, | Performed by: STUDENT IN AN ORGANIZED HEALTH CARE EDUCATION/TRAINING PROGRAM

## 2025-03-18 PROCEDURE — 99999 PR PBB SHADOW E&M-EST. PATIENT-LVL III: CPT | Mod: PBBFAC,,, | Performed by: STUDENT IN AN ORGANIZED HEALTH CARE EDUCATION/TRAINING PROGRAM

## 2025-03-18 RX ORDER — TRIAMCINOLONE ACETONIDE 40 MG/ML
40 INJECTION, SUSPENSION INTRA-ARTICULAR; INTRAMUSCULAR
Status: DISCONTINUED | OUTPATIENT
Start: 2025-03-18 | End: 2025-03-18 | Stop reason: HOSPADM

## 2025-03-18 RX ORDER — PANTOPRAZOLE SODIUM 40 MG/1
40 TABLET, DELAYED RELEASE ORAL
Qty: 90 TABLET | Refills: 1 | Status: SHIPPED | OUTPATIENT
Start: 2025-03-18

## 2025-03-18 RX ADMIN — TRIAMCINOLONE ACETONIDE 40 MG: 40 INJECTION, SUSPENSION INTRA-ARTICULAR; INTRAMUSCULAR at 10:03

## 2025-03-18 NOTE — TELEPHONE ENCOUNTER
Care Due:                  Date            Visit Type   Department     Provider  --------------------------------------------------------------------------------                                MYCHART                              FOLLOWUP/OF  [facility] INTERNAL  Terri MORALES  Last Visit: 09-      FICE VISIT   MEDICINE       Sudarshan  Next Visit: None Scheduled  None         None Found                                                            Last  Test          Frequency    Reason                     Performed    Due Date  --------------------------------------------------------------------------------    CMP.........  12 months..  atorvastatin.............  03-   03-    Lipid Panel.  12 months..  atorvastatin.............  03-   03-    [facility] Embedded Care Due Messages. Reference number: [id number].   3/17/2025 9:35:34 PM CDT

## 2025-03-18 NOTE — PROCEDURES
Large Joint Aspiration/Injection: R subacromial bursa    Date/Time: 3/18/2025 10:45 AM    Performed by: Des Huffman MD  Authorized by: Des Huffman MD    Consent Done?:  Yes (Verbal)  Indications:  Pain  Site marked: the procedure site was marked    Timeout: prior to procedure the correct patient, procedure, and site was verified    Prep: patient was prepped and draped in usual sterile fashion      Local anesthesia used?: Yes    Anesthesia:  Local infiltration  Local anesthetic:  Lidocaine 1% without epinephrine    Details:  Needle Size:  22 G  Ultrasonic Guidance for needle placement?: No    Approach:  Posterior  Location:  Shoulder  Site:  R subacromial bursa  Medications:  40 mg triamcinolone acetonide 40 mg/mL  Patient tolerance:  Patient tolerated the procedure well with no immediate complications

## 2025-03-19 ENCOUNTER — PATIENT MESSAGE (OUTPATIENT)
Dept: SPORTS MEDICINE | Facility: CLINIC | Age: 35
End: 2025-03-19
Payer: COMMERCIAL

## 2025-04-24 ENCOUNTER — OFFICE VISIT (OUTPATIENT)
Dept: FAMILY MEDICINE | Facility: CLINIC | Age: 35
End: 2025-04-24
Attending: FAMILY MEDICINE
Payer: COMMERCIAL

## 2025-04-24 VITALS
SYSTOLIC BLOOD PRESSURE: 112 MMHG | BODY MASS INDEX: 35.43 KG/M2 | TEMPERATURE: 98 F | HEIGHT: 65 IN | HEART RATE: 91 BPM | OXYGEN SATURATION: 98 % | DIASTOLIC BLOOD PRESSURE: 78 MMHG | WEIGHT: 212.63 LBS | RESPIRATION RATE: 18 BRPM

## 2025-04-24 DIAGNOSIS — E66.01 SEVERE OBESITY (BMI 35.0-35.9 WITH COMORBIDITY): ICD-10-CM

## 2025-04-24 DIAGNOSIS — E78.5 DYSLIPIDEMIA: ICD-10-CM

## 2025-04-24 DIAGNOSIS — K58.1 IRRITABLE BOWEL SYNDROME WITH CONSTIPATION: ICD-10-CM

## 2025-04-24 DIAGNOSIS — Z82.49 FAMILY HISTORY OF PREMATURE CAD: Primary | ICD-10-CM

## 2025-04-24 DIAGNOSIS — K21.9 GASTROESOPHAGEAL REFLUX DISEASE WITHOUT ESOPHAGITIS: ICD-10-CM

## 2025-04-24 DIAGNOSIS — Z72.0 TOBACCO ABUSE: ICD-10-CM

## 2025-04-24 PROCEDURE — 3074F SYST BP LT 130 MM HG: CPT | Mod: CPTII,S$GLB,, | Performed by: FAMILY MEDICINE

## 2025-04-24 PROCEDURE — G2211 COMPLEX E/M VISIT ADD ON: HCPCS | Mod: S$GLB,,, | Performed by: FAMILY MEDICINE

## 2025-04-24 PROCEDURE — 3078F DIAST BP <80 MM HG: CPT | Mod: CPTII,S$GLB,, | Performed by: FAMILY MEDICINE

## 2025-04-24 PROCEDURE — 1159F MED LIST DOCD IN RCRD: CPT | Mod: CPTII,S$GLB,, | Performed by: FAMILY MEDICINE

## 2025-04-24 PROCEDURE — 99214 OFFICE O/P EST MOD 30 MIN: CPT | Mod: S$GLB,,, | Performed by: FAMILY MEDICINE

## 2025-04-24 PROCEDURE — 1160F RVW MEDS BY RX/DR IN RCRD: CPT | Mod: CPTII,S$GLB,, | Performed by: FAMILY MEDICINE

## 2025-04-24 PROCEDURE — 3008F BODY MASS INDEX DOCD: CPT | Mod: CPTII,S$GLB,, | Performed by: FAMILY MEDICINE

## 2025-04-24 PROCEDURE — 99999 PR PBB SHADOW E&M-EST. PATIENT-LVL IV: CPT | Mod: PBBFAC,,, | Performed by: FAMILY MEDICINE

## 2025-04-24 RX ORDER — FLUTICASONE PROPIONATE 50 MCG
2 SPRAY, SUSPENSION (ML) NASAL DAILY
Qty: 16 G | Refills: 0 | Status: SHIPPED | OUTPATIENT
Start: 2025-04-24

## 2025-04-24 RX ORDER — ATORVASTATIN CALCIUM 20 MG/1
20 TABLET, FILM COATED ORAL NIGHTLY
Qty: 90 TABLET | Refills: 3 | Status: SHIPPED | OUTPATIENT
Start: 2025-04-24 | End: 2026-04-24

## 2025-04-24 RX ORDER — SEMAGLUTIDE 0.68 MG/ML
0.25 INJECTION, SOLUTION SUBCUTANEOUS
Qty: 1 EACH | Refills: 0 | Status: SHIPPED | OUTPATIENT
Start: 2025-04-24

## 2025-04-24 NOTE — PROGRESS NOTES
Subjective:       Patient ID: Rebecca Shetty is a 35 y.o. female.    Chief Complaint: Weight Loss and Medication Refill    35 y old female with Obesity , dlp , tobacco abuse, IBS and GERD  here for f.u .Doing well. Staying active with her kids and wife. Sweets are her downfall. Interested on Glp 1 inhibitors. Compliant with statin . Not ready to quit smoking . IBS symptoms are controlled with Linzess. GERD is stable on Protonix .       Review of Systems   Constitutional: Negative.    HENT: Negative.     Eyes: Negative.    Respiratory: Negative.     Cardiovascular: Negative.    Gastrointestinal: Negative.    Genitourinary: Negative.    Musculoskeletal: Negative.    Skin: Negative.    Hematological: Negative.        Objective:      Physical Exam  Constitutional:       General: She is not in acute distress.     Appearance: She is well-developed. She is not diaphoretic.   HENT:      Head: Normocephalic and atraumatic.      Right Ear: External ear normal.      Left Ear: External ear normal.      Mouth/Throat:      Pharynx: No oropharyngeal exudate.   Eyes:      General: No scleral icterus.        Right eye: No discharge.         Left eye: No discharge.      Conjunctiva/sclera: Conjunctivae normal.      Pupils: Pupils are equal, round, and reactive to light.   Neck:      Thyroid: No thyromegaly.      Vascular: No JVD.      Trachea: No tracheal deviation.   Cardiovascular:      Rate and Rhythm: Normal rate and regular rhythm.      Heart sounds: Normal heart sounds. No murmur heard.     No friction rub. No gallop.   Pulmonary:      Effort: Pulmonary effort is normal. No respiratory distress.      Breath sounds: Normal breath sounds. No stridor. No wheezing or rales.   Chest:      Chest wall: No tenderness.   Abdominal:      General: Bowel sounds are normal. There is no distension.      Palpations: Abdomen is soft.      Tenderness: There is no abdominal tenderness. There is no guarding or rebound.   Musculoskeletal:          General: Normal range of motion.      Cervical back: Normal range of motion and neck supple.   Lymphadenopathy:      Cervical: No cervical adenopathy.   Skin:     General: Skin is warm and dry.   Neurological:      Mental Status: She is alert and oriented to person, place, and time.   Psychiatric:         Mood and Affect: Mood normal.         Behavior: Behavior normal.         Thought Content: Thought content normal.         Judgment: Judgment normal.         Assessment:         Rebecca was seen today for weight loss and medication refill.    Diagnoses and all orders for this visit:    Family history of premature CAD  -     CBC Auto Differential; Future  -     Comprehensive Metabolic Panel; Future  -     Hemoglobin A1C; Future  -     Lipid Panel; Future    Dyslipidemia  -     CBC Auto Differential; Future  -     Comprehensive Metabolic Panel; Future  -     Hemoglobin A1C; Future  -     Lipid Panel; Future    Severe obesity (BMI 35.0-35.9 with comorbidity)  -     semaglutide (OZEMPIC) 0.25 mg or 0.5 mg (2 mg/3 mL) pen injector; Inject 0.25 mg into the skin every 7 days.    Irritable bowel syndrome with constipation    Gastroesophageal reflux disease without esophagitis    Tobacco abuse    Other orders  -     atorvastatin (LIPITOR) 20 MG tablet; Take 1 tablet (20 mg total) by mouth every evening.  -     fluticasone propionate (FLONASE) 50 mcg/actuation nasal spray; 2 sprays (100 mcg total) by Each Nostril route once daily.  -     linaCLOtide (LINZESS) 72 mcg Cap capsule; Take 1 capsule (72 mcg total) by mouth before breakfast.       Plan:     1.- Continue statin . Work on smoking cessation   2.- See above   3.- Ozempic . Side effects discussed. Email progress in 3 w  4.- Controlled. Cont Linzess  5.- Controlled. Continue PPI   6.- Work on smoking cessation

## 2025-04-28 RX ORDER — LINACLOTIDE 72 UG/1
72 CAPSULE, GELATIN COATED ORAL
Qty: 30 CAPSULE | Refills: 0 | Status: SHIPPED | OUTPATIENT
Start: 2025-04-28

## 2025-04-28 NOTE — TELEPHONE ENCOUNTER
Care Due:                  Date            Visit Type   Department     Provider  --------------------------------------------------------------------------------                                MYCHART                              FOLLOWUP/OF  [facility] INTERNAL  Terri MORALES  Last Visit: 04-      FICE VISIT   MEDICINE       Sudarshan  Next Visit: None Scheduled  None         None Found                                                            Last  Test          Frequency    Reason                     Performed    Due Date  --------------------------------------------------------------------------------    HBA1C.......  6 months...  semaglutide..............  03-   09-    [facility] Embedded Care Due Messages. Reference number: [id number].   4/28/2025 8:24:27 AM CDT

## 2025-04-29 ENCOUNTER — PATIENT MESSAGE (OUTPATIENT)
Dept: FAMILY MEDICINE | Facility: CLINIC | Age: 35
End: 2025-04-29
Payer: COMMERCIAL

## 2025-05-01 ENCOUNTER — LAB VISIT (OUTPATIENT)
Dept: LAB | Facility: HOSPITAL | Age: 35
End: 2025-05-01
Attending: FAMILY MEDICINE
Payer: COMMERCIAL

## 2025-05-01 DIAGNOSIS — Z82.49 FAMILY HISTORY OF PREMATURE CAD: ICD-10-CM

## 2025-05-01 DIAGNOSIS — E78.5 DYSLIPIDEMIA: ICD-10-CM

## 2025-05-01 LAB
ABSOLUTE EOSINOPHIL (OHS): 0.05 K/UL
ABSOLUTE MONOCYTE (OHS): 0.51 K/UL (ref 0.3–1)
ABSOLUTE NEUTROPHIL COUNT (OHS): 4.62 K/UL (ref 1.8–7.7)
ALBUMIN SERPL BCP-MCNC: 3.8 G/DL (ref 3.5–5.2)
ALP SERPL-CCNC: 71 UNIT/L (ref 40–150)
ALT SERPL W/O P-5'-P-CCNC: 23 UNIT/L (ref 10–44)
ANION GAP (OHS): 10 MMOL/L (ref 8–16)
AST SERPL-CCNC: 18 UNIT/L (ref 11–45)
BASOPHILS # BLD AUTO: 0.04 K/UL
BASOPHILS NFR BLD AUTO: 0.6 %
BILIRUB SERPL-MCNC: 0.7 MG/DL (ref 0.1–1)
BUN SERPL-MCNC: 13 MG/DL (ref 6–20)
CALCIUM SERPL-MCNC: 9 MG/DL (ref 8.7–10.5)
CHLORIDE SERPL-SCNC: 105 MMOL/L (ref 95–110)
CHOLEST SERPL-MCNC: 127 MG/DL (ref 120–199)
CHOLEST/HDLC SERPL: 3 {RATIO} (ref 2–5)
CO2 SERPL-SCNC: 24 MMOL/L (ref 23–29)
CREAT SERPL-MCNC: 0.7 MG/DL (ref 0.5–1.4)
EAG (OHS): 100 MG/DL (ref 68–131)
ERYTHROCYTE [DISTWIDTH] IN BLOOD BY AUTOMATED COUNT: 12.7 % (ref 11.5–14.5)
GFR SERPLBLD CREATININE-BSD FMLA CKD-EPI: >60 ML/MIN/1.73/M2
GLUCOSE SERPL-MCNC: 80 MG/DL (ref 70–110)
HBA1C MFR BLD: 5.1 % (ref 4–5.6)
HCT VFR BLD AUTO: 42 % (ref 37–48.5)
HDLC SERPL-MCNC: 43 MG/DL (ref 40–75)
HDLC SERPL: 33.9 % (ref 20–50)
HGB BLD-MCNC: 13.7 GM/DL (ref 12–16)
IMM GRANULOCYTES # BLD AUTO: 0.01 K/UL (ref 0–0.04)
IMM GRANULOCYTES NFR BLD AUTO: 0.1 % (ref 0–0.5)
LDLC SERPL CALC-MCNC: 73.6 MG/DL (ref 63–159)
LYMPHOCYTES # BLD AUTO: 2.02 K/UL (ref 1–4.8)
MCH RBC QN AUTO: 29.8 PG (ref 27–31)
MCHC RBC AUTO-ENTMCNC: 32.6 G/DL (ref 32–36)
MCV RBC AUTO: 91 FL (ref 82–98)
NONHDLC SERPL-MCNC: 84 MG/DL
NUCLEATED RBC (/100WBC) (OHS): 0 /100 WBC
PLATELET # BLD AUTO: 309 K/UL (ref 150–450)
PMV BLD AUTO: 10.5 FL (ref 9.2–12.9)
POTASSIUM SERPL-SCNC: 4 MMOL/L (ref 3.5–5.1)
PROT SERPL-MCNC: 6.5 GM/DL (ref 6–8.4)
RBC # BLD AUTO: 4.6 M/UL (ref 4–5.4)
RELATIVE EOSINOPHIL (OHS): 0.7 %
RELATIVE LYMPHOCYTE (OHS): 27.9 % (ref 18–48)
RELATIVE MONOCYTE (OHS): 7 % (ref 4–15)
RELATIVE NEUTROPHIL (OHS): 63.7 % (ref 38–73)
SODIUM SERPL-SCNC: 139 MMOL/L (ref 136–145)
TRIGL SERPL-MCNC: 52 MG/DL (ref 30–150)
WBC # BLD AUTO: 7.25 K/UL (ref 3.9–12.7)

## 2025-05-01 PROCEDURE — 36415 COLL VENOUS BLD VENIPUNCTURE: CPT | Mod: PO

## 2025-05-01 PROCEDURE — 82040 ASSAY OF SERUM ALBUMIN: CPT

## 2025-05-01 PROCEDURE — 85025 COMPLETE CBC W/AUTO DIFF WBC: CPT

## 2025-05-01 PROCEDURE — 80061 LIPID PANEL: CPT

## 2025-05-01 PROCEDURE — 83036 HEMOGLOBIN GLYCOSYLATED A1C: CPT

## 2025-05-02 ENCOUNTER — RESULTS FOLLOW-UP (OUTPATIENT)
Dept: FAMILY MEDICINE | Facility: CLINIC | Age: 35
End: 2025-05-02

## 2025-05-05 ENCOUNTER — PATIENT MESSAGE (OUTPATIENT)
Dept: FAMILY MEDICINE | Facility: CLINIC | Age: 35
End: 2025-05-05
Payer: COMMERCIAL

## 2025-05-05 DIAGNOSIS — E66.01 SEVERE OBESITY (BMI 35.0-35.9 WITH COMORBIDITY): Primary | ICD-10-CM

## 2025-05-05 RX ORDER — SEMAGLUTIDE 0.25 MG/.5ML
0.25 INJECTION, SOLUTION SUBCUTANEOUS
Qty: 2 ML | Refills: 0 | Status: SHIPPED | OUTPATIENT
Start: 2025-05-05

## 2025-05-06 DIAGNOSIS — K21.9 GASTROESOPHAGEAL REFLUX DISEASE, UNSPECIFIED WHETHER ESOPHAGITIS PRESENT: ICD-10-CM

## 2025-05-06 RX ORDER — PANTOPRAZOLE SODIUM 40 MG/1
40 TABLET, DELAYED RELEASE ORAL
Qty: 90 TABLET | Refills: 3 | Status: SHIPPED | OUTPATIENT
Start: 2025-05-06

## 2025-05-06 NOTE — TELEPHONE ENCOUNTER
No care due was identified.  [facility] Embedded Care Due Messages. Reference number: [id number].   5/06/2025 3:51:33 PM CDT

## 2025-05-06 NOTE — TELEPHONE ENCOUNTER
Refill Decision Note   Rebecca Shetty  is requesting a refill authorization.  Brief Assessment and Rationale for Refill:  Approve     Medication Therapy Plan:         Comments:     Note composed:5:38 PM 05/06/2025

## 2025-05-29 ENCOUNTER — OFFICE VISIT (OUTPATIENT)
Dept: FAMILY MEDICINE | Facility: CLINIC | Age: 35
End: 2025-05-29
Payer: COMMERCIAL

## 2025-05-29 VITALS
HEART RATE: 76 BPM | BODY MASS INDEX: 33.41 KG/M2 | RESPIRATION RATE: 16 BRPM | OXYGEN SATURATION: 98 % | HEIGHT: 65 IN | WEIGHT: 200.5 LBS | TEMPERATURE: 96 F | SYSTOLIC BLOOD PRESSURE: 110 MMHG | DIASTOLIC BLOOD PRESSURE: 70 MMHG

## 2025-05-29 DIAGNOSIS — E66.01 SEVERE OBESITY (BMI 35.0-35.9 WITH COMORBIDITY): Primary | ICD-10-CM

## 2025-05-29 DIAGNOSIS — E66.01 SEVERE OBESITY (BMI 35.0-35.9 WITH COMORBIDITY): ICD-10-CM

## 2025-05-29 DIAGNOSIS — K58.1 IRRITABLE BOWEL SYNDROME WITH CONSTIPATION: ICD-10-CM

## 2025-05-29 PROCEDURE — 99999 PR PBB SHADOW E&M-EST. PATIENT-LVL IV: CPT | Mod: PBBFAC,,, | Performed by: NURSE PRACTITIONER

## 2025-05-29 RX ORDER — SEMAGLUTIDE 0.25 MG/.5ML
0.25 INJECTION, SOLUTION SUBCUTANEOUS
Qty: 2 ML | Refills: 1 | Status: SHIPPED | OUTPATIENT
Start: 2025-05-29 | End: 2025-05-29 | Stop reason: SDUPTHER

## 2025-05-29 NOTE — TELEPHONE ENCOUNTER
Copied from CRM #[id number]. Topic: General Inquiry - Return Call  >> May 29, 2025  3:18 PM Vicente wrote:  Type:  Patient Requesting Call    Who Called:Rebecca Shetty  Does the patient know what this is regarding?:pt is calling to speak with nurse regarding medication  Would the patient rather a call back or a response via [employer]? call  Best Call Back Number: [phone]  Additional Information: pharmacy have not received prescription

## 2025-05-30 RX ORDER — SEMAGLUTIDE 0.25 MG/.5ML
0.25 INJECTION, SOLUTION SUBCUTANEOUS
Qty: 2 ML | Refills: 1 | Status: SHIPPED | OUTPATIENT
Start: 2025-05-30

## 2025-05-31 ENCOUNTER — PATIENT MESSAGE (OUTPATIENT)
Dept: FAMILY MEDICINE | Facility: CLINIC | Age: 35
End: 2025-05-31
Payer: COMMERCIAL

## 2025-06-02 DIAGNOSIS — E66.01 SEVERE OBESITY (BMI 35.0-35.9 WITH COMORBIDITY): ICD-10-CM

## 2025-06-02 RX ORDER — FLUTICASONE PROPIONATE 50 MCG
2 SPRAY, SUSPENSION (ML) NASAL
Qty: 48 G | Refills: 3 | Status: SHIPPED | OUTPATIENT
Start: 2025-06-02

## 2025-06-02 RX ORDER — METHYLPREDNISOLONE 4 MG/1
TABLET ORAL
Qty: 1 EACH | Refills: 0 | Status: SHIPPED | OUTPATIENT
Start: 2025-06-02

## 2025-06-02 RX ORDER — SEMAGLUTIDE 0.25 MG/.5ML
0.5 INJECTION, SOLUTION SUBCUTANEOUS
Qty: 3 ML | Refills: 1 | Status: SHIPPED | OUTPATIENT
Start: 2025-06-02

## 2025-07-02 ENCOUNTER — OFFICE VISIT (OUTPATIENT)
Dept: FAMILY MEDICINE | Facility: CLINIC | Age: 35
End: 2025-07-02
Payer: COMMERCIAL

## 2025-07-02 VITALS
WEIGHT: 197.31 LBS | HEIGHT: 65 IN | DIASTOLIC BLOOD PRESSURE: 60 MMHG | SYSTOLIC BLOOD PRESSURE: 104 MMHG | BODY MASS INDEX: 32.87 KG/M2 | HEART RATE: 93 BPM | TEMPERATURE: 98 F | OXYGEN SATURATION: 97 %

## 2025-07-02 DIAGNOSIS — E66.01 SEVERE OBESITY (BMI 35.0-35.9 WITH COMORBIDITY): ICD-10-CM

## 2025-07-02 DIAGNOSIS — E66.01 SEVERE OBESITY (BMI 35.0-35.9 WITH COMORBIDITY): Primary | ICD-10-CM

## 2025-07-02 DIAGNOSIS — K59.03 DRUG INDUCED CONSTIPATION: ICD-10-CM

## 2025-07-02 PROCEDURE — 99213 OFFICE O/P EST LOW 20 MIN: CPT | Mod: S$GLB,,, | Performed by: NURSE PRACTITIONER

## 2025-07-02 PROCEDURE — 3044F HG A1C LEVEL LT 7.0%: CPT | Mod: CPTII,S$GLB,, | Performed by: NURSE PRACTITIONER

## 2025-07-02 PROCEDURE — 3078F DIAST BP <80 MM HG: CPT | Mod: CPTII,S$GLB,, | Performed by: NURSE PRACTITIONER

## 2025-07-02 PROCEDURE — 3008F BODY MASS INDEX DOCD: CPT | Mod: CPTII,S$GLB,, | Performed by: NURSE PRACTITIONER

## 2025-07-02 PROCEDURE — 99999 PR PBB SHADOW E&M-EST. PATIENT-LVL IV: CPT | Mod: PBBFAC,,, | Performed by: NURSE PRACTITIONER

## 2025-07-02 PROCEDURE — 3074F SYST BP LT 130 MM HG: CPT | Mod: CPTII,S$GLB,, | Performed by: NURSE PRACTITIONER

## 2025-07-02 RX ORDER — SEMAGLUTIDE 0.25 MG/.5ML
0.5 INJECTION, SOLUTION SUBCUTANEOUS
Qty: 4 ML | Refills: 1 | Status: SHIPPED | OUTPATIENT
Start: 2025-07-02 | End: 2025-07-03

## 2025-07-03 DIAGNOSIS — E66.01 SEVERE OBESITY (BMI 35.0-35.9 WITH COMORBIDITY): ICD-10-CM

## 2025-07-03 RX ORDER — SEMAGLUTIDE 0.25 MG/.5ML
0.5 INJECTION, SOLUTION SUBCUTANEOUS WEEKLY
Qty: 4 ML | Refills: 1 | Status: SHIPPED | OUTPATIENT
Start: 2025-07-03 | End: 2025-07-03

## 2025-07-07 NOTE — PROGRESS NOTES
Subjective:       Patient ID: Rebecca Shetty is a 35 y.o. female.    Chief Complaint: Medication Refill    History of Present Illness    Patient presents today for follow up of weight loss management She reports a 15-pound weight loss over 2 months. She experiences increased food cravings since starting Wegovy 0.5 mg, which she alternates injection sites between arms. She acknowledges periods of dietary non-compliance. She is currently not exercising due to work demands. She reports severe constipation with up to a week without bowel movements, despite taking probiotics, prebiotics, fiber, and Linzess 2 pills daily. This is being monitored before considering Wegovy dose adjustment.      ROS:  General: -fever, -chills, -fatigue, -weight gain, -weight loss  Eyes: -vision changes, -redness, -discharge  ENT: -ear pain, -nasal congestion, -sore throat  Cardiovascular: -chest pain, -palpitations, -lower extremity edema  Respiratory: -cough, -shortness of breath  Gastrointestinal: -abdominal pain, -nausea, -vomiting, -diarrhea, +constipation, -blood in stool, +unusual cravings  Genitourinary: -dysuria, -hematuria, -frequency  Musculoskeletal: -joint pain, -muscle pain  Skin: -rash, -lesion  Neurological: -headache, -dizziness, -numbness, -tingling  Psychiatric: -anxiety, -depression, -sleep difficulty        Past Medical History:   Diagnosis Date    Gastroesophageal reflux disease without esophagitis 3/24/2023    Hypercholesteremia     Hypertension       Medications Ordered Prior to Encounter[1]       Objective:      Physical Exam    General: In no acute distress.  Head: Normocephalic. Non traumatic.  Eyes: PERRLA. EOMs full. Conjunctivae clear. Fundi grossly normal.  Ears: EACs clear. TMs normal.  Nose: Mucosa pink. Mucosa moist. No obstruction.  Throat: Clear. No exudates. No lesions.  Neck: Supple. No masses. No thyromegaly. No bruits.  Chest: Lungs clear. No rales. No rhonchi. No wheezes.  Heart: RRR. No murmurs.  No rubs. No gallops.  Abdomen: Soft. No tenderness. No masses. BS normal.  : Normal external genitalia. No lesions. No discharge. No hernias  noted.  Back: Normal curvature. No scoliosis. No tenderness.  Extremities: Warm. Well perfused. No upper extremity edema. No lower extremity edema. FROM. No deformities. No joint erythema.  Neuro: No focal deficits appreciated. Good muscle tone. Normal response to visual stimuli. Normal response to auditory stimuli.  Skin: Normal. No rashes. No lesions noted.          Assessment/Plan:     1. Severe obesity (BMI 35.0-35.9 with comorbidity)    2. Drug induced constipation       Assessment & Plan    CHRONIC IDIOPATHIC CONSTIPATION:  - Patient continues to experience significant constipation with bowel movements only once weekly despite current regimen of probiotics, prebiotics, and fiber supplements.  - Linzess has proven ineffective even with twice daily dosing.  - Prescribed lactulose to be taken 3 times daily with meals, with instructions to start with one dose at night and gradually increase based on response.  - Recommend experimenting with combinations of lactulose, Miralax, and Dulcolax to establish an effective routine.  - Patient to follow up via email regarding lactulose effectiveness and overall constipation management.    WEIGHT MANAGEMENT:  - Maintaining Wegovy at current dose of 0.5 mg due to significant constipation concerns.  - Patient has achieved 15 lbs of weight loss over 2 months on this regimen.  - Will defer increasing Wegovy dose until constipation is better managed.  - Patient advised to contact office if constipation improves and wishes to discuss Wegovy dose adjustment.    FOLLOW-UP:  - Follow up in 6 months.               Follow up in about 6 months (around 1/2/2026).    This note was generated with the assistance of ambient listening technology. Verbal consent was obtained by the patient and accompanying visitor(s) for the recording of patient  appointment to facilitate this note. I attest to having reviewed and edited the generated note for accuracy, though some syntax or spelling errors may persist. Please contact the author of this note for any clarification.            [1]   Current Outpatient Medications on File Prior to Visit   Medication Sig Dispense Refill    atorvastatin (LIPITOR) 20 MG tablet Take 1 tablet (20 mg total) by mouth every evening. 90 tablet 3    fluticasone propionate (FLONASE) 50 mcg/actuation nasal spray USE 2 SPRAYS IN EACH NOSTRIL ONCE DAILY 48 g 3    pantoprazole (PROTONIX) 40 MG tablet TAKE 1 TABLET BY MOUTH ONCE DAILY 90 tablet 3    varenicline (CHANTIX STARTING MONTH BOX) 0.5 mg (11)- 1 mg (42) tablet Take one 0.5mg tab by mouth once daily X3 days,then increase to one 0.5mg tab twice daily X4 days,then increase to one 1mg tab twice daily (Patient not taking: Reported on 6/10/2025) 1 each 0    varenicline (CHANTIX) 1 mg Tab Take 1 tablet (1 mg total) by mouth 2 (two) times daily. (Patient not taking: Reported on 6/10/2025) 60 tablet 4     Current Facility-Administered Medications on File Prior to Visit   Medication Dose Route Frequency Provider Last Rate Last Admin    albuterol inhaler 2 puff  2 puff Inhalation Q20 Min PRN Fabiano Lopez MD        levonorgestreL (KYLEENA) 17.5 mcg/24 hrs (5 yrs) 19.5 mg IUD 17.5 mcg  17.5 mcg Intrauterine  Lucy House MD   17.5 mcg at 05/11/22 5204

## 2025-07-18 ENCOUNTER — PATIENT MESSAGE (OUTPATIENT)
Dept: FAMILY MEDICINE | Facility: CLINIC | Age: 35
End: 2025-07-18
Payer: COMMERCIAL

## 2025-07-28 ENCOUNTER — HOSPITAL ENCOUNTER (OUTPATIENT)
Dept: RADIOLOGY | Facility: HOSPITAL | Age: 35
Discharge: HOME | End: 2025-07-28
Attending: NURSE PRACTITIONER
Payer: COMMERCIAL

## 2025-07-28 ENCOUNTER — OFFICE VISIT (OUTPATIENT)
Dept: FAMILY MEDICINE | Facility: CLINIC | Age: 35
End: 2025-07-28
Payer: COMMERCIAL

## 2025-07-28 VITALS
HEART RATE: 93 BPM | BODY MASS INDEX: 31.51 KG/M2 | DIASTOLIC BLOOD PRESSURE: 70 MMHG | HEIGHT: 65 IN | SYSTOLIC BLOOD PRESSURE: 104 MMHG | WEIGHT: 189.13 LBS | OXYGEN SATURATION: 99 % | TEMPERATURE: 98 F

## 2025-07-28 DIAGNOSIS — M54.50 ACUTE LEFT-SIDED LOW BACK PAIN WITHOUT SCIATICA: ICD-10-CM
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
## 2025-07-28 DIAGNOSIS — M54.9 MUSCULOSKELETAL BACK PAIN: ICD-10-CM

## 2025-07-28 PROCEDURE — 72100 X-RAY EXAM L-S SPINE 2/3 VWS: CPT | Mod: 26,,, | Performed by: RADIOLOGY

## 2025-07-28 PROCEDURE — 3074F SYST BP LT 130 MM HG: CPT | Mod: CPTII,S$GLB,, | Performed by: NURSE PRACTITIONER

## 2025-07-28 PROCEDURE — 99214 OFFICE O/P EST MOD 30 MIN: CPT | Mod: S$GLB,,, | Performed by: NURSE PRACTITIONER

## 2025-07-28 PROCEDURE — 3078F DIAST BP <80 MM HG: CPT | Mod: CPTII,S$GLB,, | Performed by: NURSE PRACTITIONER

## 2025-07-28 PROCEDURE — 3044F HG A1C LEVEL LT 7.0%: CPT | Mod: CPTII,S$GLB,, | Performed by: NURSE PRACTITIONER

## 2025-07-28 PROCEDURE — 3008F BODY MASS INDEX DOCD: CPT | Mod: CPTII,S$GLB,, | Performed by: NURSE PRACTITIONER

## 2025-07-28 PROCEDURE — 72100 X-RAY EXAM L-S SPINE 2/3 VWS: CPT | Mod: TC,PO

## 2025-07-28 PROCEDURE — 99999 PR PBB SHADOW E&M-EST. PATIENT-LVL IV: CPT | Mod: PBBFAC,,, | Performed by: NURSE PRACTITIONER

## 2025-07-28 RX ORDER — METHOCARBAMOL 750 MG/1
750 TABLET, FILM COATED ORAL 3 TIMES DAILY PRN
COMMUNITY
Start: 2025-07-24 | End: 2025-07-29

## 2025-07-28 RX ORDER — LIDOCAINE 50 MG/G
1 PATCH TOPICAL
COMMUNITY
Start: 2025-07-24 | End: 2025-08-23

## 2025-07-28 RX ORDER — CYCLOBENZAPRINE HCL 10 MG
10 TABLET ORAL 3 TIMES DAILY PRN
Qty: 30 TABLET | Refills: 1 | Status: SHIPPED | OUTPATIENT
Start: 2025-07-28 | End: 2025-08-07

## 2025-07-28 RX ORDER — DICLOFENAC SODIUM 75 MG/1
75 TABLET, DELAYED RELEASE ORAL 2 TIMES DAILY
Qty: 20 TABLET | Refills: 1 | Status: SHIPPED | OUTPATIENT
Start: 2025-07-28 | End: 2025-08-07

## 2025-07-28 RX ORDER — NAPROXEN 500 MG/1
500 TABLET ORAL 2 TIMES DAILY PRN
COMMUNITY
Start: 2025-07-24 | End: 2025-07-28 | Stop reason: ALTCHOICE

## 2025-07-28 NOTE — LETTER
July 28, 2025      [facility]  [address]  [facility] ([place], [place]) [address]  Phone: [phone]  Fax: [phone]       Patient: Rebecca Shetty   YOB: 1990  Date of Visit: 07/28/2025    To Whom It May Concern:    MINDY Shetty  was at [facility] on 07/28/2025. The patient may return to work/school on 7/29/25 with restrictions of no excessive bending or heavy lifting >10lbs for 2 weeks . If you have any questions or concerns, or if I can be of further assistance, please do not hesitate to contact me.    Sincerely,    Jalen Vargas MA

## 2025-07-30 DIAGNOSIS — E66.01 SEVERE OBESITY (BMI 35.0-35.9 WITH COMORBIDITY): ICD-10-CM
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
## 2025-08-29 ENCOUNTER — OFFICE VISIT (OUTPATIENT)
Dept: FAMILY MEDICINE | Facility: CLINIC | Age: 35
End: 2025-08-29
Payer: COMMERCIAL

## 2025-08-29 VITALS
TEMPERATURE: 97 F | RESPIRATION RATE: 16 BRPM | OXYGEN SATURATION: 98 % | BODY MASS INDEX: 30.06 KG/M2 | DIASTOLIC BLOOD PRESSURE: 70 MMHG | HEIGHT: 65 IN | SYSTOLIC BLOOD PRESSURE: 110 MMHG | HEART RATE: 74 BPM | WEIGHT: 180.44 LBS

## 2025-08-29 DIAGNOSIS — G43.809 OTHER MIGRAINE WITHOUT STATUS MIGRAINOSUS, NOT INTRACTABLE: ICD-10-CM

## 2025-08-29 DIAGNOSIS — E66.01 SEVERE OBESITY (BMI 35.0-35.9 WITH COMORBIDITY): Primary | ICD-10-CM

## 2025-08-29 PROCEDURE — 99999 PR PBB SHADOW E&M-EST. PATIENT-LVL IV: CPT | Mod: PBBFAC,,, | Performed by: NURSE PRACTITIONER

## 2025-08-29 RX ORDER — BUTALBITAL, ACETAMINOPHEN AND CAFFEINE 50; 325; 40 MG/1; MG/1; MG/1
1 TABLET ORAL EVERY 6 HOURS PRN
Qty: 20 TABLET | Refills: 0 | Status: SHIPPED | OUTPATIENT
Start: 2025-08-29 | End: 2025-09-28

## 2025-08-29 RX ORDER — CYCLOBENZAPRINE HCL 10 MG
10 TABLET ORAL NIGHTLY PRN
COMMUNITY
Start: 2025-08-07 | End: 2025-08-29

## 2025-08-29 RX ORDER — MELOXICAM 15 MG/1
15 TABLET ORAL EVERY MORNING
COMMUNITY
Start: 2025-08-07 | End: 2025-08-29

## 2025-08-29 RX ORDER — KETOROLAC TROMETHAMINE 30 MG/ML
60 INJECTION, SOLUTION INTRAMUSCULAR; INTRAVENOUS
Status: COMPLETED | OUTPATIENT
Start: 2025-08-29 | End: 2025-08-29

## 2025-08-29 RX ADMIN — KETOROLAC TROMETHAMINE 60 MG: 30 INJECTION, SOLUTION INTRAMUSCULAR; INTRAVENOUS at 08:08
